# Patient Record
Sex: FEMALE | Race: OTHER | NOT HISPANIC OR LATINO | Employment: UNEMPLOYED | URBAN - METROPOLITAN AREA
[De-identification: names, ages, dates, MRNs, and addresses within clinical notes are randomized per-mention and may not be internally consistent; named-entity substitution may affect disease eponyms.]

---

## 2019-11-06 ENCOUNTER — INITIAL PRENATAL (OUTPATIENT)
Dept: OBGYN CLINIC | Facility: CLINIC | Age: 29
End: 2019-11-06

## 2019-11-06 DIAGNOSIS — Z34.01 ENCOUNTER FOR SUPERVISION OF NORMAL FIRST PREGNANCY IN FIRST TRIMESTER: Primary | ICD-10-CM

## 2019-11-06 PROCEDURE — NOBC: Performed by: NURSE PRACTITIONER

## 2019-11-06 NOTE — PROGRESS NOTES
Christi Odom is a 30year old  1 para 0 [place]  female who presents for her obstetrical intake  She is accompanied by her  and female family member Richard Adams to help with translation  Offered Deltagen interpretor due to medical terminology, however  declines  She is  8w 6d gestation, based upon exact LMP of 19  Periods are relatively regular  This pregnancy was unplanned but is desired  Obstetrical history:  None    Personal history:  Thyroid dysfunction-- was on synthroid in [facility], but PCP told her she didn't need it anymore    Surgical history:  None    Medications  None    Allergies:  NKDA    Family history:  Heart disease- mother  Diabetes- MGF    Substance use:  None    Genetic history:  Unremarkable    Infection history:  Unremarkable  + varicella      ASSESSMENT / PLAN:    1  Encounter for supervision of normal first pregnancy in first trimester  With assistance of patient's family member/  reviewed pregnancy packet, prenatal lab studies and  genetic screening  RV for dating/viability US  Undecided about genetic screening at this time  Questions answered  Encouraged flu vaccine-- she will consider  RV for ob exam with Dr Qiana Mabry  - Obstetric panel; Future  - HIV 1/2 AG-AB combo; Future  - Urine culture  - Ambulatory Referral to Maternal Fetal Medicine; Future  - Hemoglobin Electrophoresis; Future  - TSH, 3rd generation with Free T4 reflex;  Future

## 2019-11-08 LAB
EXTERNAL ABO GROUPING: NORMAL
EXTERNAL ANTIBODY SCREEN: NORMAL
EXTERNAL GENE TEST BETA-THALASSEMIA: NORMAL
EXTERNAL HEMATOCRIT: 38.2 %
EXTERNAL HEMOGLOBIN: 12.8 G/DL
EXTERNAL HEPATITIS B SURFACE ANTIGEN: NEGATIVE
EXTERNAL HIV-1 ANTIBODY: NEGATIVE
EXTERNAL HIV-1/2 AB-AG: NORMAL
EXTERNAL PLATELET COUNT: 214 K/ΜL
EXTERNAL RH FACTOR: POSITIVE
EXTERNAL RUBELLA IGG QUANTITATION: POSITIVE
EXTERNAL SYPHILIS RPR SCREEN: NORMAL

## 2019-11-11 ENCOUNTER — OB ABSTRACT (OUTPATIENT)
Dept: OBGYN CLINIC | Facility: CLINIC | Age: 29
End: 2019-11-11

## 2019-11-11 PROBLEM — E07.9 DISEASE OF THYROID GLAND: Status: ACTIVE | Noted: 2019-11-11

## 2019-11-22 ENCOUNTER — HOSPITAL ENCOUNTER (OUTPATIENT)
Dept: NON INVASIVE DIAGNOSTICS | Facility: HOSPITAL | Age: 29
Discharge: HOME | End: 2019-11-22

## 2019-11-22 ENCOUNTER — ULTRASOUND (OUTPATIENT)
Dept: OBGYN CLINIC | Facility: CLINIC | Age: 29
End: 2019-11-22

## 2019-11-22 ENCOUNTER — ROUTINE PRENATAL (OUTPATIENT)
Dept: OBGYN CLINIC | Facility: CLINIC | Age: 29
End: 2019-11-22

## 2019-11-22 ENCOUNTER — HOSPITAL ENCOUNTER (EMERGENCY)
Facility: HOSPITAL | Age: 29
Discharge: HOME | End: 2019-11-22
Attending: EMERGENCY MEDICINE | Admitting: EMERGENCY MEDICINE

## 2019-11-22 VITALS
SYSTOLIC BLOOD PRESSURE: 118 MMHG | BODY MASS INDEX: 24.51 KG/M2 | WEIGHT: 166 LBS | DIASTOLIC BLOOD PRESSURE: 76 MMHG | HEART RATE: 91 BPM | OXYGEN SATURATION: 98 %

## 2019-11-22 VITALS
BODY MASS INDEX: 23.86 KG/M2 | RESPIRATION RATE: 16 BRPM | DIASTOLIC BLOOD PRESSURE: 60 MMHG | WEIGHT: 161.6 LBS | SYSTOLIC BLOOD PRESSURE: 96 MMHG | TEMPERATURE: 98.3 F | OXYGEN SATURATION: 98 % | HEART RATE: 90 BPM

## 2019-11-22 DIAGNOSIS — O36.80X0 PREGNANCY WITH INCONCLUSIVE FETAL VIABILITY, SINGLE OR UNSPECIFIED FETUS: Primary | ICD-10-CM

## 2019-11-22 DIAGNOSIS — Z23 NEED FOR INFLUENZA VACCINATION: ICD-10-CM

## 2019-11-22 DIAGNOSIS — Z12.4 PAP SMEAR FOR CERVICAL CANCER SCREENING: ICD-10-CM

## 2019-11-22 DIAGNOSIS — M79.604 RIGHT LEG PAIN: ICD-10-CM

## 2019-11-22 DIAGNOSIS — I82.401 RIGHT LEG DVT (HCC): ICD-10-CM

## 2019-11-22 DIAGNOSIS — O22.30 DVT (DEEP VEIN THROMBOSIS) IN PREGNANCY: Primary | ICD-10-CM

## 2019-11-22 DIAGNOSIS — O22.31 DVT COMPLICATING PREGNANCY, FIRST TRIMESTER: Primary | ICD-10-CM

## 2019-11-22 DIAGNOSIS — Z34.01 ENCOUNTER FOR SUPERVISION OF NORMAL FIRST PREGNANCY IN FIRST TRIMESTER: ICD-10-CM

## 2019-11-22 DIAGNOSIS — R60.0 LEG EDEMA, RIGHT: ICD-10-CM

## 2019-11-22 PROBLEM — Z34.91 SUPERVISION OF NORMAL PREGNANCY IN FIRST TRIMESTER: Status: ACTIVE | Noted: 2019-11-22

## 2019-11-22 LAB
ANION GAP SERPL CALCULATED.3IONS-SCNC: 6 MMOL/L (ref 4–13)
APTT PPP: 27 SECONDS (ref 23–37)
BASOPHILS # BLD AUTO: 0.03 THOUSANDS/ΜL (ref 0–0.1)
BASOPHILS NFR BLD AUTO: 0 % (ref 0–1)
BUN SERPL-MCNC: 7 MG/DL (ref 5–25)
CALCIUM SERPL-MCNC: 9.5 MG/DL (ref 8.3–10.1)
CHLORIDE SERPL-SCNC: 100 MMOL/L (ref 100–108)
CO2 SERPL-SCNC: 26 MMOL/L (ref 21–32)
CREAT SERPL-MCNC: 0.45 MG/DL (ref 0.6–1.3)
EOSINOPHIL # BLD AUTO: 0.14 THOUSAND/ΜL (ref 0–0.61)
EOSINOPHIL NFR BLD AUTO: 2 % (ref 0–6)
ERYTHROCYTE [DISTWIDTH] IN BLOOD BY AUTOMATED COUNT: 12.4 % (ref 11.6–15.1)
EXTERNAL CHLAMYDIA RESULT: NOT DETECTED
GFR SERPL CREATININE-BSD FRML MDRD: 137 ML/MIN/1.73SQ M
GLUCOSE SERPL-MCNC: 87 MG/DL (ref 65–140)
HCT VFR BLD AUTO: 34.4 % (ref 34.8–46.1)
HGB BLD-MCNC: 11.1 G/DL (ref 11.5–15.4)
IMM GRANULOCYTES # BLD AUTO: 0.04 THOUSAND/UL (ref 0–0.2)
IMM GRANULOCYTES NFR BLD AUTO: 1 % (ref 0–2)
INR PPP: 1.04 (ref 0.84–1.19)
LYMPHOCYTES # BLD AUTO: 1.73 THOUSANDS/ΜL (ref 0.6–4.47)
LYMPHOCYTES NFR BLD AUTO: 20 % (ref 14–44)
MCH RBC QN AUTO: 28.7 PG (ref 26.8–34.3)
MCHC RBC AUTO-ENTMCNC: 32.3 G/DL (ref 31.4–37.4)
MCV RBC AUTO: 89 FL (ref 82–98)
MONOCYTES # BLD AUTO: 0.6 THOUSAND/ΜL (ref 0.17–1.22)
MONOCYTES NFR BLD AUTO: 7 % (ref 4–12)
N GONORRHOEA RRNA SPEC QL PROBE: NOT DETECTED
NEUTROPHILS # BLD AUTO: 6.06 THOUSANDS/ΜL (ref 1.85–7.62)
NEUTS SEG NFR BLD AUTO: 70 % (ref 43–75)
NRBC BLD AUTO-RTO: 0 /100 WBCS
PLATELET # BLD AUTO: 177 THOUSANDS/UL (ref 149–390)
PMV BLD AUTO: 9.7 FL (ref 8.9–12.7)
POTASSIUM SERPL-SCNC: 4 MMOL/L (ref 3.5–5.3)
PROTHROMBIN TIME: 13.7 SECONDS (ref 11.6–14.5)
RBC # BLD AUTO: 3.87 MILLION/UL (ref 3.81–5.12)
SODIUM SERPL-SCNC: 132 MMOL/L (ref 136–145)
WBC # BLD AUTO: 8.6 THOUSAND/UL (ref 4.31–10.16)

## 2019-11-22 PROCEDURE — 36415 COLL VENOUS BLD VENIPUNCTURE: CPT | Performed by: EMERGENCY MEDICINE

## 2019-11-22 PROCEDURE — 85610 PROTHROMBIN TIME: CPT | Performed by: EMERGENCY MEDICINE

## 2019-11-22 PROCEDURE — 99215 OFFICE O/P EST HI 40 MIN: CPT | Performed by: OBSTETRICS & GYNECOLOGY

## 2019-11-22 PROCEDURE — 90686 IIV4 VACC NO PRSV 0.5 ML IM: CPT | Performed by: OBSTETRICS & GYNECOLOGY

## 2019-11-22 PROCEDURE — 99283 EMERGENCY DEPT VISIT LOW MDM: CPT

## 2019-11-22 PROCEDURE — 85730 THROMBOPLASTIN TIME PARTIAL: CPT | Performed by: EMERGENCY MEDICINE

## 2019-11-22 PROCEDURE — 99285 EMERGENCY DEPT VISIT HI MDM: CPT | Performed by: EMERGENCY MEDICINE

## 2019-11-22 PROCEDURE — 93970 EXTREMITY STUDY: CPT

## 2019-11-22 PROCEDURE — 85025 COMPLETE CBC W/AUTO DIFF WBC: CPT | Performed by: EMERGENCY MEDICINE

## 2019-11-22 PROCEDURE — 90471 IMMUNIZATION ADMIN: CPT | Performed by: OBSTETRICS & GYNECOLOGY

## 2019-11-22 PROCEDURE — 80048 BASIC METABOLIC PNL TOTAL CA: CPT | Performed by: EMERGENCY MEDICINE

## 2019-11-22 PROCEDURE — 76801 OB US < 14 WKS SINGLE FETUS: CPT | Performed by: OBSTETRICS & GYNECOLOGY

## 2019-11-22 PROCEDURE — 96372 THER/PROPH/DIAG INJ SC/IM: CPT

## 2019-11-22 PROCEDURE — 93970 EXTREMITY STUDY: CPT | Performed by: SURGERY

## 2019-11-22 RX ORDER — FOLIC ACID 1 MG/1
TABLET ORAL DAILY
COMMUNITY
End: 2021-01-20

## 2019-11-22 RX ADMIN — ENOXAPARIN SODIUM 70 MG: 80 INJECTION SUBCUTANEOUS at 13:39

## 2019-11-22 NOTE — PROGRESS NOTES
SV BTZ=810 BPM  CRL=38 4mm=10w5d  RT CL Cyst=25 x 24 x 25 mm    EDC=6/14/2020    UT=87 x 70 x 76 mm  RT OV=34 x 34 x 29 mm, with CL Cyst=25 x 24 x 25 mm  LT OV=32 x 28 x 20 mm

## 2019-11-22 NOTE — ED PROVIDER NOTES
History  Chief Complaint   Patient presents with    Leg Pain     Pt arrives with   Reports that on Monday started with left leg pain  Pt went to her OB who ordered an u/s study of her legs, and found a DVT  Pt was then referred to ED  History provided by:  Patient   used: Yes    Medical Problem   Location:  Sent here after an outpatient duplex was positive for dvt of the right leg  She is 11 weeks pregnant  First baby  Severity:  Moderate  Onset quality:  Sudden  Duration:  1 week  Timing:  Constant  Progression:  Worsening  Chronicity:  New  Context:  Pain behind the right knee for a week  No swelling  No trauma  No cp or sob  No h/o DVT or PE  No risk factors for DVT or PE other than pregnancy  No FH of DVT/PE  Relieved by:  Nothing  Worsened by:  Palpation, ambulation  Ineffective treatments:  None tried  Associated symptoms: no abdominal pain, no chest pain and no shortness of breath    She travelled to this country from [facility] in June  That was the last significant traveling that she did  Prior to Admission Medications   Prescriptions Last Dose Informant Patient Reported? Taking?   folic acid (FOLVITE) 1 mg tablet  Self Yes No   Sig: Take by mouth daily      Facility-Administered Medications: None       Past Medical History:   Diagnosis Date    Disease of thyroid gland     Varicella        History reviewed  No pertinent surgical history  Family History   Problem Relation Age of Onset    Heart disease Mother     Diabetes Maternal Grandfather     Breast cancer Neg Hx     Colon cancer Neg Hx     Ovarian cancer Neg Hx     Uterine cancer Neg Hx      I have reviewed and agree with the history as documented      Social History     Tobacco Use    Smoking status: Never Smoker    Smokeless tobacco: Never Used   Substance Use Topics    Alcohol use: Never     Frequency: Never    Drug use: Never        Review of Systems   Respiratory: Negative for chest tightness and shortness of breath  Cardiovascular: Negative for chest pain and leg swelling  Gastrointestinal: Negative for abdominal pain  Genitourinary: Negative for hematuria, pelvic pain and vaginal bleeding  Musculoskeletal: Negative for gait problem and joint swelling  All other systems reviewed and are negative  Physical Exam  Physical Exam   Constitutional: She appears well-developed and well-nourished  She is cooperative  Non-toxic appearance  She does not have a sickly appearance  She does not appear ill  No distress  HENT:   Head: Normocephalic and atraumatic  Right Ear: Hearing normal  No drainage or swelling  Left Ear: Hearing normal  No drainage or swelling  Mouth/Throat: Mucous membranes are normal    Eyes: Conjunctivae and lids are normal  Right eye exhibits no discharge  Left eye exhibits no discharge  Neck: Trachea normal and normal range of motion  No JVD present  Cardiovascular: Normal rate, regular rhythm, normal heart sounds, intact distal pulses and normal pulses  Exam reveals no gallop and no friction rub  No murmur heard  Pulmonary/Chest: Effort normal and breath sounds normal  No stridor  No respiratory distress  She has no wheezes  She has no rales  Abdominal: Soft  Normal appearance  There is no tenderness  There is no rebound, no guarding and no CVA tenderness  Musculoskeletal: Normal range of motion  She exhibits tenderness  She exhibits no edema or deformity  Right upper leg: Normal         Right lower leg: She exhibits tenderness  She exhibits no bony tenderness, no swelling, no edema and no deformity  Neurological: She is alert  She has normal strength  No cranial nerve deficit or sensory deficit  She exhibits normal muscle tone  GCS eye subscore is 4  GCS verbal subscore is 5  GCS motor subscore is 6  Skin: Skin is warm, dry and intact  No rash noted  She is not diaphoretic  No pallor  Psychiatric: She has a normal mood and affect   Her speech is normal  Cognition and memory are normal    Nursing note and vitals reviewed        Vital Signs  ED Triage Vitals [11/22/19 1150]   Temperature Pulse Respirations Blood Pressure SpO2   98 3 °F (36 8 °C) 86 16 101/59 98 %      Temp Source Heart Rate Source Patient Position - Orthostatic VS BP Location FiO2 (%)   Oral Monitor Sitting Right arm --      Pain Score       Worst Possible Pain           Vitals:    11/22/19 1150 11/22/19 1259 11/22/19 1443   BP: 101/59 100/64 96/60   Pulse: 86 85 90   Patient Position - Orthostatic VS: Sitting Sitting Sitting         Visual Acuity      ED Medications  Medications   enoxaparin (LOVENOX) subcutaneous injection 70 mg (70 mg Subcutaneous Given 11/22/19 1339)       Diagnostic Studies  Results Reviewed     Procedure Component Value Units Date/Time    Protime-INR [[id number]]  (Normal) Collected:  11/22/19 1317    Lab Status:  Final result Specimen:  Blood from Arm, Right Updated:  11/22/19 1337     Protime 13 7 seconds      INR 1 04    APTT [[id number]]  (Normal) Collected:  11/22/19 1317    Lab Status:  Final result Specimen:  Blood from Arm, Right Updated:  11/22/19 1337     PTT 27 seconds     Basic metabolic panel [[id number]]  (Abnormal) Collected:  11/22/19 1317    Lab Status:  Final result Specimen:  Blood from Arm, Right Updated:  11/22/19 1334     Sodium 132 mmol/L      Potassium 4 0 mmol/L      Chloride 100 mmol/L      CO2 26 mmol/L      ANION GAP 6 mmol/L      BUN 7 mg/dL      Creatinine 0 45 mg/dL      Glucose 87 mg/dL      Calcium 9 5 mg/dL      eGFR 137 ml/min/1 73sq m     Narrative:       Fern guidelines for Chronic Kidney Disease (CKD):     Stage 1 with normal or high GFR (GFR > 90 mL/min/1 73 square meters)    Stage 2 Mild CKD (GFR = 60-89 mL/min/1 73 square meters)    Stage 3A Moderate CKD (GFR = 45-59 mL/min/1 73 square meters)    Stage 3B Moderate CKD (GFR = 30-44 mL/min/1 73 square meters)    Stage 4 Severe CKD (GFR = 15-29 mL/min/1 73 square meters)    Stage 5 End Stage CKD (GFR <15 mL/min/1 73 square meters)  Note: GFR calculation is accurate only with a steady state creatinine    CBC and differential [[id number]]  (Abnormal) Collected:  11/22/19 1317    Lab Status:  Final result Specimen:  Blood from Arm, Right Updated:  11/22/19 1324     WBC 8 60 Thousand/uL      RBC 3 87 Million/uL      Hemoglobin 11 1 g/dL      Hematocrit 34 4 %      MCV 89 fL      MCH 28 7 pg      MCHC 32 3 g/dL      RDW 12 4 %      MPV 9 7 fL      Platelets 208 Thousands/uL      nRBC 0 /100 WBCs      Neutrophils Relative 70 %      Immat GRANS % 1 %      Lymphocytes Relative 20 %      Monocytes Relative 7 %      Eosinophils Relative 2 %      Basophils Relative 0 %      Neutrophils Absolute 6 06 Thousands/µL      Immature Grans Absolute 0 04 Thousand/uL      Lymphocytes Absolute 1 73 Thousands/µL      Monocytes Absolute 0 60 Thousand/µL      Eosinophils Absolute 0 14 Thousand/µL      Basophils Absolute 0 03 Thousands/µL                  No orders to display              Procedures  Procedures       ED Course  ED Course as of Nov 22 1516   Fri Nov 22, 2019   1254 Per the vascular ultrasound report patient has a acute DVT in the distal femoral vein, popliteal vein and posterior tibial veins and peroneal veins throughout the calf  The left leg was within normal limits  MDM  Number of Diagnoses or Management Options  DVT (deep vein thrombosis) in pregnancy:   Right leg DVT [facility]):   Diagnosis management comments: I did discuss the case with the patient's OBGYN doctor will place her on Lovenox  I did involve case management in helping with the medication  She currently does not have insurance but apparently her insurance is pending and should get in the next couple of months  A coupon was given so that they can obtain the medication at [facility] and the patient and the  were agreeable to this plan of action    She does not have any bleeding issues  Her renal function is normal   Her platelets are normal   She was given the medication with teaching by the nurse  She has no signs or symptoms of PE  I did go over the importance of continuing this medication through her pregnancy  Amount and/or Complexity of Data Reviewed  Clinical lab tests: reviewed and ordered  Tests in the radiology section of CPT®: reviewed  Discuss the patient with other providers: yes    Patient Progress  Patient progress: stable      Disposition  Final diagnoses:   DVT (deep vein thrombosis) in pregnancy   Right leg DVT ([address] )     Time reflects when diagnosis was documented in both MDM as applicable and the Disposition within this note     Time User Action Codes Description Comment    11/22/2019  1:47 PM Tia Griselda Add [O22 30] DVT (deep vein thrombosis) in pregnancy     11/22/2019  1:47 PM Tia Griselda Modify [O22 30] DVT (deep vein thrombosis) in pregnancy     11/22/2019  2:50 PM Tia Griselda Add [I82 401] Right leg DVT [facility])       ED Disposition     ED Disposition Condition Date/Time Comment    Discharge Stable Fri Nov 22, 2019  2:49 PM Janeth Franco discharge to home/self care  Follow-up Information     Follow up With Specialties Details Why Contact Info    Betina Weiss MD Emergency Medicine, Internal Medicine Schedule an appointment as soon as possible for a visit in 1 week  [address] [id number]  [phone]            Patient's Medications   Discharge Prescriptions    ENOXAPARIN (LOVENOX) 80 MG/0 8 ML    Inject 0 7 mL (70 mg total) under the skin 2 (two) times a day       Start Date: 11/22/2019End Date: 12/22/2019       Order Dose: 70 mg       Quantity: 42 mL    Refills: 0     No discharge procedures on file      ED Provider  Electronically Signed by           Murphy Kehr, MD  11/22/19 3282

## 2019-11-22 NOTE — PROGRESS NOTES
Late Entry:  Pt is a 30 y o   11w1d  Pregnancy is uncomplicated thus far  Pt denies quickening  Denies vb, lof, ctx  Pt reports that on Monday she developed a sharp pain in the back of her right leg and it has been persistent since that time, making it difficult to walk  Pt's  reports he went to [facility] and asked the pharmacist for support stockings and bought her thigh high support stockings at that time, of which she only wears the right  This only helped mildly and the patient continues to have pain  On examination the right thigh, knee and calf were noted to appear larger than the left and upon  Measuring them, this finding was consistent  Pt had significant tenderness in the popliteal region of the right leg  Pt advised she likely has a DVT and advised to have duplex scan which we were able to schedule for 10:30 am today  Pap smear and cultures obtained  Office u/s reveals IUP cwd with +FHT  Influenza vaccination administered  PNL labs wnl and reviewed with patient  PTL precautions reviewed  Received phone call regarding duplex:  RIGHT LOWER LIMB:  There is acute deep vein thrombosis in the distal femoral vein, popliteal vein,  and the posterior tibial veins and peroneal veins throughout the calf  No evidence of superficial thrombophlebitis noted  Doppler evaluation shows a normal response to augmentation maneuvers  Popliteal, posterior tibial and anterior tibial arterial Doppler waveforms are  Triphasic  Reviewed with patient the findings on the phone while she was in radiology  Advised pt to go to ED for further evaluation and treatment  Pt agreeable    Received phone call from ED physician after his evaluation--per protocol no CT chest performed as pt with normal vital signs and no c/o SOB   helping pt to obtain Lovenox as she does not have insurance yet (MA is pending) and pt given therapeutic dose of Lovenox in ED      Spoke with Dr Phyllis Walton from [facility] for added input--recommends Beta 2 glycoprotein, Anticardiolipin Ab, Prothrombin Gene mutation and Factor V leiden testing as these are not affected by Lovenox and follow up with [facility]  Referral placed  Pt called and notified [facility] would contact her to schedule appointment and that I faxed her lab slips to the [facility] that she reports she will be able to have her labs drawn at on Monday  Pt advised to go to ED if she develops SOB or worsening symptoms   Pt agreeable

## 2019-11-22 NOTE — ED NOTES
Reviewed with pt and  about the following  · The concentration Lovenox syringes will come in will be the same as here: 80 mg/0 8 mL  · Discarding the excess medication for the appropriate dose of 70 mg  · Cleaning the skin  · Safety mechanism of needle  · Proper disposal of needles      Freya Bergeron RN  11/22/19 3242

## 2019-11-27 ENCOUNTER — OB ABSTRACT (OUTPATIENT)
Dept: OBGYN CLINIC | Facility: CLINIC | Age: 29
End: 2019-11-27

## 2019-12-03 ENCOUNTER — ULTRASOUND (OUTPATIENT)
Dept: OBGYN CLINIC | Facility: CLINIC | Age: 29
End: 2019-12-03

## 2019-12-03 ENCOUNTER — TELEPHONE (OUTPATIENT)
Dept: OBGYN CLINIC | Facility: CLINIC | Age: 29
End: 2019-12-03

## 2019-12-03 DIAGNOSIS — Z03.74 FETAL GROWTH PROBLEM SUSPECTED BUT NOT FOUND: Primary | ICD-10-CM

## 2019-12-03 DIAGNOSIS — O22.31 DVT COMPLICATING PREGNANCY, FIRST TRIMESTER: ICD-10-CM

## 2019-12-03 DIAGNOSIS — O22.30 DVT (DEEP VEIN THROMBOSIS) IN PREGNANCY: ICD-10-CM

## 2019-12-03 PROCEDURE — 76801 OB US < 14 WKS SINGLE FETUS: CPT | Performed by: OBSTETRICS & GYNECOLOGY

## 2019-12-03 NOTE — TELEPHONE ENCOUNTER
Patient's  is asking that a new script for Lovenox be sent to [address] [place] [address] and is requesting a phone call from Dr Momin Seek

## 2019-12-04 NOTE — TELEPHONE ENCOUNTER
I called her  back  He reports that he only has 8 doses of her lovenox remaining and refills are need  I have sent them    He was questioning if his wife would be on injections for life  I reviewed that she will require anticoagulation throughout the pregnancy and postpartum for 6-12 weeks depending on MFM recommendation and then she will stop assuming she does not develop another clot, but will require lovenox in each subsequent pregnancy as well  We also reviewed that closer to term, she will switch to heparin for management of her delivery

## 2019-12-20 ENCOUNTER — ROUTINE PRENATAL (OUTPATIENT)
Dept: OBGYN CLINIC | Facility: CLINIC | Age: 29
End: 2019-12-20

## 2019-12-20 VITALS
SYSTOLIC BLOOD PRESSURE: 100 MMHG | BODY MASS INDEX: 25.18 KG/M2 | HEIGHT: 69 IN | DIASTOLIC BLOOD PRESSURE: 64 MMHG | WEIGHT: 170 LBS

## 2019-12-20 DIAGNOSIS — O22.32 DVT COMPLICATING PREGNANCY, SECOND TRIMESTER: Primary | ICD-10-CM

## 2019-12-20 PROCEDURE — 99213 OFFICE O/P EST LOW 20 MIN: CPT | Performed by: OBSTETRICS & GYNECOLOGY

## 2019-12-20 NOTE — PROGRESS NOTES
Pt is a 30 y o   15w1d  Pregnancy is complicated by DVT in first trimester  Pt reports denies quickening  Denies vb, lof, ctx  PTL precautions  Reviewed  PNL labs reviewed and wnl  Pt reports she had her factor V leiden, Prothrombin gene, b2 glycoprotein labs were marilyn, results are still pending  MFM consult placed for anatomy scan  Pt taking lovenox twice daily  F/U 4 weeks

## 2019-12-28 DIAGNOSIS — O22.30 DVT (DEEP VEIN THROMBOSIS) IN PREGNANCY: ICD-10-CM

## 2020-01-02 ENCOUNTER — TELEPHONE (OUTPATIENT)
Dept: GYNECOLOGY | Facility: CLINIC | Age: 30
End: 2020-01-02

## 2020-01-02 NOTE — TELEPHONE ENCOUNTER
Patient's  called stating that they are having issues getting refill at [facility] for Lovenox  Asked if new script could be sent to [facility] in [place]  [facility] [id number]

## 2020-01-08 ENCOUNTER — TELEPHONE (OUTPATIENT)
Dept: OBGYN CLINIC | Facility: CLINIC | Age: 30
End: 2020-01-08

## 2020-01-08 NOTE — TELEPHONE ENCOUNTER
I called the patient back    She reports that her right foot is swollen but there is no pain  She denies any swelling in her calf or thigh  Pt advised she may go to ED for further evaluation secondary to h o DVT even though she is on blood thinners  Pt reports that since it is only in her foot she will observe and rest and see if it improves  Advised to go to ED with increasing swelling, development of pain, SOB or swelling in calf or thigh   Pt agreeaboe

## 2020-01-08 NOTE — TELEPHONE ENCOUNTER
Patient's  called asking to speak to Dr Crystal Coombs in regards to changes to his wife's leg  He did state that she is not in pain, but had a few questions for Dr Crystal Coombs that he would like to speak to her directly

## 2020-01-17 ENCOUNTER — ROUTINE PRENATAL (OUTPATIENT)
Dept: OBGYN CLINIC | Facility: CLINIC | Age: 30
End: 2020-01-17
Payer: COMMERCIAL

## 2020-01-17 VITALS
BODY MASS INDEX: 25.52 KG/M2 | HEART RATE: 81 BPM | WEIGHT: 172.8 LBS | SYSTOLIC BLOOD PRESSURE: 100 MMHG | DIASTOLIC BLOOD PRESSURE: 60 MMHG | OXYGEN SATURATION: 98 %

## 2020-01-17 DIAGNOSIS — O99.891 BACK PAIN AFFECTING PREGNANCY IN SECOND TRIMESTER: ICD-10-CM

## 2020-01-17 DIAGNOSIS — Z86.39 HISTORY OF THYROID DISEASE: ICD-10-CM

## 2020-01-17 DIAGNOSIS — R30.0 DYSURIA DURING PREGNANCY IN SECOND TRIMESTER: ICD-10-CM

## 2020-01-17 DIAGNOSIS — M54.9 BACK PAIN AFFECTING PREGNANCY IN SECOND TRIMESTER: ICD-10-CM

## 2020-01-17 DIAGNOSIS — O22.32 DVT COMPLICATING PREGNANCY, SECOND TRIMESTER: Primary | ICD-10-CM

## 2020-01-17 DIAGNOSIS — O26.892 DYSURIA DURING PREGNANCY IN SECOND TRIMESTER: ICD-10-CM

## 2020-01-17 LAB
SL AMB  POCT GLUCOSE, UA: NEGATIVE
SL AMB LEUKOCYTE ESTERASE,UA: NEGATIVE
SL AMB POCT BILIRUBIN,UA: NORMAL
SL AMB POCT BLOOD,UA: NEGATIVE
SL AMB POCT CLARITY,UA: NORMAL
SL AMB POCT COLOR,UA: NORMAL
SL AMB POCT KETONES,UA: NEGATIVE
SL AMB POCT NITRITE,UA: NEGATIVE
SL AMB POCT PH,UA: 6
SL AMB POCT SPECIFIC GRAVITY,UA: 1.03
SL AMB POCT URINE PROTEIN: NEGATIVE
SL AMB POCT UROBILINOGEN: 0.2

## 2020-01-17 PROCEDURE — 81002 URINALYSIS NONAUTO W/O SCOPE: CPT | Performed by: OBSTETRICS & GYNECOLOGY

## 2020-01-17 PROCEDURE — PNV: Performed by: OBSTETRICS & GYNECOLOGY

## 2020-01-17 NOTE — PROGRESS NOTES
Pt is a 30 y o   19w1d  Pregnancy is complicated by DVT in the first trimester  Pt reports +FM  Denies vb, lof, ctx  PTL precautions reviewed  Pt c/o dyuria, UDip negative, will send for culture  Pt c o back pain, advised support belt  Pt continues to use her Lovenox without complication  Pt with minimal right foot swelling  Pt reports a h o being treated for thyroid issues in [facility] and is concerned that since her physician in [facility] told her to stop her Thyroid studies are not normal--TSH ordered  Has anatomy scan scheduled for   F/u 4 weeks

## 2020-01-19 PROBLEM — D68.51 FACTOR V LEIDEN, PROTHROMBIN GENE MUTATION (HCC): Status: ACTIVE | Noted: 2020-01-19

## 2020-01-19 PROBLEM — D68.51 FACTOR V LEIDEN MUTATION AFFECTING PREGNANCY (HCC): Status: ACTIVE | Noted: 2020-01-19

## 2020-01-19 PROBLEM — O99.119 FACTOR V LEIDEN MUTATION AFFECTING PREGNANCY (HCC): Status: ACTIVE | Noted: 2020-01-19

## 2020-01-20 PROBLEM — D68.51 FACTOR V LEIDEN, PROTHROMBIN GENE MUTATION (HCC): Status: RESOLVED | Noted: 2020-01-19 | Resolved: 2020-01-20

## 2020-01-20 NOTE — PATIENT INSTRUCTIONS
Thank you for choosing us for your  care today  If you have any questions about your ultrasound or care, please do not hesitate to contact us or your primary obstetrician  Some general instructions for your pregnancy are:     Exercise: we encourage most pregnant women to get regular physical activity in pregnancy  Exercise has been shown to reduce the risk of several pregnancy-related complications  Unless instructed otherwise, you can aim for 22 minutes per day (150 minutes per week! )   Nutrition: aim for calcium-rich and iron-rich foods as well as healthy sources of protein   Weight: ask your doctor what is the appropriate amount of weight for you to gain in pregnancy  We have nutritionists here if you would like to meet with them   Protection from influenza: get yourself and your entire household vaccinated against influenza  Tell your partner to get vaccinated as well  Good hand hygiene can reduce the spread of this potentially deadly virus  Insist that everyone who is going to hold or be around your baby get vaccinated   Educate yourself about preeclampsia: preeclampsia is a common, serious complication in pregnancy  A blood pressure of 140mmHg (top number or systolic) OR 67FFPL (bottom number or diastolic) is elevated and needs evaluation by your doctor  Ask your doctor early in pregnancy if you should take aspirin (not motrin or tylenol) to prevent preeclampsia  If you were advised to take aspirin to prevent preeclampsia, a daily dose of 162mg or 81mg is advised  One resource to learn more is www  preeclampsia org    If you smoke, try to reduce how many cigarettes you smoke or quit completely  Do not vape       Other warning signs to watch out for in pregnancy or postpartum: chest pain, obstructed breathing or shortness of breath, seizures, thoughts of hurting yourself or your baby, bleeding, a painful or swollen leg, fever, or headache (AWHONN POST-BIRTH Warning Signs campaign)  If these happen call 911  Itching is also not normal in pregnancy and if you experience this, especially over your hands and feet, potentially worse at night, notify your doctors  Placenta Previa   WHAT YOU NEED TO KNOW:   What is placenta previa? Placenta previa is a condition in which your placenta grows near or over your cervix (opening of your uterus)  The placenta forms during pregnancy and provides oxygen and nutrition to your unborn baby  The placenta also removes waste products from the fetus  Normally, your placenta grows in the upper part of your uterus  When your placenta grows near your cervix, it may block the opening to your vagina  You may have vaginal bleeding that could harm you and your unborn baby  What increases my risk for placenta previa? The exact cause of placenta previa is not clear  Your risk increases as you get older  Your risk also increases if you are of  descent  The following may also increase your risk:  · Previous placenta previa:  Your risk for placenta previa increases if you have had it during previous pregnancies  · Infertility treatments:  Treatments used to increase your ability to get pregnant may make you more likely to have placenta previa  · Pregnancy: The more times you have been pregnant, the higher your risk for this condition  Your risk increases if your pregnancies are less than 1 year apart  Being pregnant with more than 1 baby, such as twins, also increases your risk  Past pregnancies that resulted in an  or miscarriage may also increase your risk for placenta previa  · Past :  A  can cause changes in your uterine tissue that increase your risk for placenta previa  · Smoking and street drug use:  Smoking before and during pregnancy increases your risk for placenta previa  Use of street drugs such as cocaine also may lead to placenta previa      · Uterine scarring:  Placenta previa is more likely to occur if you have scarring in your uterus  Scarring may occur from a past surgery or pregnancy  Scarring may increase the risk that your placenta will grow in the lower part of your uterus  What are the signs and symptoms of placenta previa? · Vaginal bleeding:  Bleeding usually occurs in the late second or early third trimester of pregnancy, but can occur at any time  You may have small or large amounts of bleeding that normally does not cause pain  You may have bleeding after sex  Bleeding may also occur suddenly  · Contractions:  Contractions may cause abdominal pain or cramping  How is placenta previa diagnosed? Placenta previa is often found during a normal pregnancy visit with your healthcare provider  The later in your pregnancy it is found, the higher the risk that it will not go away  Your healthcare provider will ask about your health and any previous pregnancies  Tell him if you have had past uterine surgeries or procedures  Your healthcare provider may check your cervix by gently putting a speculum into your vagina  A speculum is a tool that opens your vagina to help your healthcare provider see your cervix  You may also need any of the following:  · Ultrasound:      ¨ Transvaginal ultrasound:  A small tube is placed inside your vagina so your healthcare provider can see your uterus  It may show if your placenta lies in the lower part of your uterus  It may also show how close your placenta is to the edge or top of your cervix  ¨ Abdominal ultrasound:  A small device will be moved around your abdomen to show pictures of your uterus  Your healthcare provider may want you to have a full bladder for this test  It may show if your placenta is blocking the opening of your uterus  It may also show problems with your baby, such as slow growth  ¨ Doppler ultrasound:  A Doppler ultrasound may be done to check if your placenta has grown into the wall of your uterus      · MRI:  This scan uses powerful magnets and a computer to take pictures of your pelvis  MRI pictures may show where your placenta is in your uterus  An MRI may also show if and how deep your placenta has grown into your uterine wall  You may be given dye to help the placenta show up better in the pictures  Tell the healthcare provider if you have ever had an allergic reaction to contrast dye  Do not enter the MRI room with anything made of metal  Metal can cause serious injury  Tell the healthcare provider if you have any metal in or on your body  How is placenta previa treated? Placenta previa may go away later in your pregnancy, and you may not need treatment  Your placenta may move when your uterus changes shape as you get closer to delivery  If you need treatment, it may depend on how far along you are in your pregnancy  Your treatment also depends on how much of your cervix is covered by your placenta  You may need any of the following:  · Bed rest:  You may need to be on bedrest until your baby is ready to be born  You may be able to rest at home, or you may need to stay in the hospital  If you are in the hospital, your healthcare provider may keep you on a monitor  A monitor is used to watch your vital signs (heart rate, breathing, and blood pressure)  You may also need to wear a fetal monitor  Ask your healthcare provider which activities you may do while you are on bedrest     · Blood transfusion:  You may need a blood transfusion if you lose a large amount of blood  During a blood transfusion, you will get donated blood through an IV  You may need a transfusion while you are still pregnant or after your baby is born  · Medicines:      ¨ Steroids:  Steroids may be given if you need to deliver your baby earlier than expected  These medicines help your baby's lungs to mature and prevent breathing problems after he is born      ¨ Rh immune globulin shot:  You may be given an Rh immune globulin injection (RhIG) before your baby is born  RhIG is needed if you and your baby have different Rh blood types (incompatibility)  Rh incompatibility means that your baby has a protein in his blood that you do not  Your body may make antibodies against your baby's blood and destroy his blood cells  RhIG injections prevent this from happening  ¨ Tocolytics: Tocolytics are given to stop contractions if your baby is not ready to be born  Contractions are when the muscles of your uterus tighten and loosen  · Amniocentesis: An amniocentesis test may be done between weeks 36 and 37 of your pregnancy if you are not bleeding  This test is done to check your unborn baby's lungs if you have a  date planned  Ask your healthcare provider for more information about amniocentesis  · Delivery of your baby:  Early delivery of your baby may be needed  If your due date is close and your placenta does not cover your cervix, you may be able to give birth vaginally  If your placenta covers most or all of your cervix, a  will be done  A  may also be done if you have heavy bleeding or you or your baby is in danger  What are the risks of placenta previa? · Medicines used to treat placenta previa may cause you to have a fast heartbeat  You may be at risk for blood clots  Your placenta may pull away from your uterus when you have contractions  You may have sudden, large amounts of bleeding  You may need a blood transfusion to replace the blood you have lost  Large amounts of blood loss may be life-threatening  Placenta previa increases your risk of placenta accreta  Placenta accreta is when your placenta attaches deep into the wall of your uterus  The deep attachment makes it hard for your placenta to come out after your baby is born  Placenta accreta increases your risk for bleeding even further  · Your baby may not grow as he should inside your uterus  He may be forced into an abnormal birthing position   Your unborn baby may also have problems with his heartbeat  Your baby may be born too early and his lungs may not be fully developed  He may also be very small  This could be life-threatening for your baby  If you need surgery to deliver your baby, you may get an infection or bleed more than expected  After you give birth, you may need surgery to remove your uterus if you have bleeding that cannot be stopped  You may get a blood clot in your leg or arm  The clot may travel to your heart or brain and cause life-threatening problems, such as a heart attack or stroke  How can I manage placenta previa? · Have a safety plan and someone to take you to the hospital if needed  · Stay within a short distance to the hospital so you can get there quickly  · Do not douche or have sex  These may cause bleeding  When should I contact my healthcare provider? · You feel abdominal cramps, pressure, or tightening  · Your heart is beating faster than normal for you  · You have questions or concerns about your condition or care  When should I seek immediate care or call 911? · You have any amount of bleeding from your vagina  · You are having severe abdominal pain or contractions  · You feel faint or too weak to stand up  · You suddenly feel lightheaded and short of breath  · You have chest pain when you take a deep breath or cough  · You cough up blood  CARE AGREEMENT:   You have the right to help plan your care  Learn about your health condition and how it may be treated  Discuss treatment options with your caregivers to decide what care you want to receive  You always have the right to refuse treatment  The above information is an  only  It is not intended as medical advice for individual conditions or treatments  Talk to your doctor, nurse or pharmacist before following any medical regimen to see if it is safe and effective for you    © 2017 [employer] Information is for End User's use only and may not be sold, redistributed or otherwise used for commercial purposes  All illustrations and images included in CareNotes® are the copyrighted property of A D A M [employer] Inc  or Donavon Reyes

## 2020-01-22 ENCOUNTER — ROUTINE PRENATAL (OUTPATIENT)
Dept: PERINATAL CARE | Facility: CLINIC | Age: 30
End: 2020-01-22
Payer: COMMERCIAL

## 2020-01-22 VITALS
HEIGHT: 69 IN | SYSTOLIC BLOOD PRESSURE: 114 MMHG | DIASTOLIC BLOOD PRESSURE: 58 MMHG | HEART RATE: 90 BPM | BODY MASS INDEX: 25.62 KG/M2 | WEIGHT: 173 LBS

## 2020-01-22 DIAGNOSIS — Z36.86 ENCOUNTER FOR ANTENATAL SCREENING FOR CERVICAL LENGTH: ICD-10-CM

## 2020-01-22 DIAGNOSIS — O99.119 FACTOR V LEIDEN MUTATION AFFECTING PREGNANCY (HCC): ICD-10-CM

## 2020-01-22 DIAGNOSIS — D68.51 FACTOR V LEIDEN MUTATION AFFECTING PREGNANCY (HCC): ICD-10-CM

## 2020-01-22 DIAGNOSIS — O22.32 DVT COMPLICATING PREGNANCY, SECOND TRIMESTER: Primary | ICD-10-CM

## 2020-01-22 DIAGNOSIS — O44.40 LOW-LYING PLACENTA: ICD-10-CM

## 2020-01-22 DIAGNOSIS — Z36.3 ENCOUNTER FOR ANTENATAL SCREENING FOR MALFORMATIONS: ICD-10-CM

## 2020-01-22 PROBLEM — O44.02 PLACENTA PREVIA, SECOND TRIMESTER: Status: ACTIVE | Noted: 2020-01-22

## 2020-01-22 PROCEDURE — 76805 OB US >/= 14 WKS SNGL FETUS: CPT | Performed by: OBSTETRICS & GYNECOLOGY

## 2020-01-22 PROCEDURE — 76817 TRANSVAGINAL US OBSTETRIC: CPT | Performed by: OBSTETRICS & GYNECOLOGY

## 2020-01-22 PROCEDURE — 99242 OFF/OP CONSLTJ NEW/EST SF 20: CPT | Performed by: OBSTETRICS & GYNECOLOGY

## 2020-01-22 NOTE — PROGRESS NOTES
A transvaginal ultrasound was performed  Sonographer note on use of High Level Disinfection Process (Trophon) for transvaginal probe# 1 used, serial # [id number]     Isha Brito RDMS

## 2020-01-22 NOTE — LETTER
January 24, 2020     Daniel Fischer MD  [address]    Patient: Devora Marlow   YOB: 1990   Date of Visit: 1/22/2020       Dear Dr Hilario Martinez: Thank you for referring Jaenth Franco to me for evaluation  Below are my notes for this consultation  If you have questions, please do not hesitate to call me  I look forward to following your patient along with you  Sincerely,        Niels Velarde MD        CC: No Recipients  Niels Velarde MD  1/22/2020  3:14 PM  Sign at close encounter  [facility]: Ms Binta Gutierrez was seen today at 19w6d for anatomic survey and cervical length screening ultrasound  See ultrasound report under "OB Procedures" tab  Please don't hesitate to contact our office with any concerns or questions    Niels Velarde MD

## 2020-01-22 NOTE — PROGRESS NOTES
[facility]: Ms Barbara Vail was seen today at 19w6d for anatomic survey and cervical length screening ultrasound  See ultrasound report under "OB Procedures" tab  Please don't hesitate to contact our office with any concerns or questions    Waldron Dubin, MD

## 2020-01-23 ENCOUNTER — TELEPHONE (OUTPATIENT)
Dept: OBGYN CLINIC | Facility: CLINIC | Age: 30
End: 2020-01-23

## 2020-01-23 PROBLEM — D68.51 HETEROZYGOUS FACTOR V LEIDEN MUTATION (HCC): Status: ACTIVE | Noted: 2020-01-23

## 2020-02-10 ENCOUNTER — TELEPHONE (OUTPATIENT)
Dept: OBGYN CLINIC | Facility: CLINIC | Age: 30
End: 2020-02-10

## 2020-02-10 ENCOUNTER — ROUTINE PRENATAL (OUTPATIENT)
Dept: OBGYN CLINIC | Facility: CLINIC | Age: 30
End: 2020-02-10

## 2020-02-10 VITALS
OXYGEN SATURATION: 98 % | BODY MASS INDEX: 25.98 KG/M2 | DIASTOLIC BLOOD PRESSURE: 68 MMHG | WEIGHT: 175.4 LBS | HEIGHT: 69 IN | HEART RATE: 83 BPM | SYSTOLIC BLOOD PRESSURE: 100 MMHG

## 2020-02-10 DIAGNOSIS — O44.40 LOW-LYING PLACENTA: ICD-10-CM

## 2020-02-10 DIAGNOSIS — O22.32 DVT COMPLICATING PREGNANCY, SECOND TRIMESTER: Primary | ICD-10-CM

## 2020-02-10 PROCEDURE — PNV: Performed by: OBSTETRICS & GYNECOLOGY

## 2020-02-10 NOTE — TELEPHONE ENCOUNTER
Pt's  called requesting Dr Leticia Boothe to please call him he has a few questions for provider about his wife

## 2020-02-10 NOTE — PROGRESS NOTES
Pt is a 30 y o   22w4d  Pregnancy is complicated by DVT, Factor V Leiden Heterozygote, low lying placenta  Pt reports +FM  Denies vb, lof, ctx  PTL precautions reviewed  Normal anatomy scan, low lying placenta, repeat at 32 weeks  F/U 4 weeks

## 2020-02-13 NOTE — TELEPHONE ENCOUNTER
I called the patient's  back  He reports his wife is upset because she understood her placenta is weak  I reviewed with her  that placenta is close to the cervix (low lying)  We reviewed that she will have a repeat u/s at 32 weeks to ensure that it has "moved"  We reviewed that as the uterus grows, the likelihood that the placental edge will be far enough away to deliver vaginally is high  He reports a good understanding and will reassure her

## 2020-03-16 ENCOUNTER — ULTRASOUND (OUTPATIENT)
Dept: PERINATAL CARE | Facility: CLINIC | Age: 30
End: 2020-03-16
Payer: COMMERCIAL

## 2020-03-16 ENCOUNTER — ROUTINE PRENATAL (OUTPATIENT)
Dept: OBGYN CLINIC | Facility: CLINIC | Age: 30
End: 2020-03-16

## 2020-03-16 VITALS
BODY MASS INDEX: 27.47 KG/M2 | DIASTOLIC BLOOD PRESSURE: 62 MMHG | WEIGHT: 186 LBS | OXYGEN SATURATION: 98 % | HEART RATE: 101 BPM | SYSTOLIC BLOOD PRESSURE: 114 MMHG

## 2020-03-16 VITALS
SYSTOLIC BLOOD PRESSURE: 96 MMHG | HEART RATE: 86 BPM | WEIGHT: 185 LBS | HEIGHT: 69 IN | DIASTOLIC BLOOD PRESSURE: 54 MMHG | BODY MASS INDEX: 27.4 KG/M2

## 2020-03-16 DIAGNOSIS — O44.40 LOW-LYING PLACENTA: ICD-10-CM

## 2020-03-16 DIAGNOSIS — O22.32 DVT COMPLICATING PREGNANCY, SECOND TRIMESTER: Primary | ICD-10-CM

## 2020-03-16 DIAGNOSIS — O44.40 LOW-LYING PLACENTA: Primary | ICD-10-CM

## 2020-03-16 DIAGNOSIS — Z36.89 ENCOUNTER FOR ULTRASOUND TO CHECK FETAL GROWTH: ICD-10-CM

## 2020-03-16 PROCEDURE — 76817 TRANSVAGINAL US OBSTETRIC: CPT | Performed by: OBSTETRICS & GYNECOLOGY

## 2020-03-16 PROCEDURE — 76816 OB US FOLLOW-UP PER FETUS: CPT | Performed by: OBSTETRICS & GYNECOLOGY

## 2020-03-16 PROCEDURE — PNV: Performed by: OBSTETRICS & GYNECOLOGY

## 2020-03-16 NOTE — PROGRESS NOTES
A transvaginal ultrasound was performed  Sonographer note on use of High Level Disinfection Process (Trophon) for transvaginal probe# 1  used, serial # [id number]  UB [id number]    Chloé Farias RDMS

## 2020-03-16 NOTE — PROGRESS NOTES
[facility]: Ms Adrian Luna was seen today at 27w4d for followup placental location ultrasound with fetal growth measurement  See ultrasound report under "OB Procedures" tab  Please don't hesitate to contact our office with any concerns or questions    Natacha Gonsales MD

## 2020-03-16 NOTE — PATIENT INSTRUCTIONS
Thank you for choosing us for your  care today  If you have any questions about your ultrasound or care, please do not hesitate to contact us or your primary obstetrician  Some general instructions for your pregnancy are:     Exercise: we encourage most pregnant women to get regular physical activity in pregnancy  Exercise has been shown to reduce the risk of several pregnancy-related complications  Unless instructed otherwise, you can aim for 22 minutes per day (150 minutes per week! )   Nutrition: aim for calcium-rich and iron-rich foods as well as healthy sources of protein   Weight: ask your doctor what is the appropriate amount of weight for you to gain in pregnancy  We have nutritionists here if you would like to meet with them   Protect against the flu: get yourself and your entire household vaccinated against influenza  Tell your partner to get vaccinated as well  Good hand hygiene can reduce the spread of this potentially deadly virus  Insist that everyone who is going to hold or be around your baby get vaccinated   Learn about Preeclampsia: preeclampsia is a common, serious complication in pregnancy  A blood pressure of 140mmHg (top number or systolic) OR 39RHUN (bottom number or diastolic) is elevated and needs evaluation by your doctor  Ask your doctor early in pregnancy if you should take aspirin (not motrin or tylenol) to prevent preeclampsia  If you were advised to take aspirin to prevent preeclampsia, a daily dose of 162mg or 81mg is advised  One resource to learn more is www  preeclampsia org    If you smoke, try to reduce how many cigarettes you smoke or quit completely  Do not vape   Other warning signs to watch out for in pregnancy or postpartum: chest pain, obstructed breathing or shortness of breath, seizures, thoughts of hurting yourself or your baby, bleeding, a painful or swollen leg, fever, or headache (AWHONN POST-BIRTH Warning Signs campaign)    If these happen call 911  Itching is also not normal in pregnancy and if you experience this, especially over your hands and feet, potentially worse at night, notify your doctors

## 2020-03-16 NOTE — PROGRESS NOTES
Pt is a 30 y o   27w4d  Pregnancy is complicated by DVT, Factor V Leiden Heterozygote, low lying placenta  Pt reports +FM  Denies vb, lof, ctx  PTL precautions and fkc reviewed  Has follow up u/s today for low lying placenta  3d trimester lab slips given  F/U 2 weeks

## 2020-03-17 LAB
EXTERNAL HEMATOCRIT: 35.3 %
EXTERNAL HEMOGLOBIN: 11.6 G/DL
EXTERNAL PLATELET COUNT: 165 K/ΜL
EXTERNAL SYPHILIS RPR SCREEN: NORMAL

## 2020-03-18 ENCOUNTER — OB ABSTRACT (OUTPATIENT)
Dept: OBGYN CLINIC | Facility: CLINIC | Age: 30
End: 2020-03-18

## 2020-03-21 LAB — GLUCOSE 1H P 50 G GLC PO SERPL-MCNC: 163 MG/DL (ref 70–183)

## 2020-03-22 ENCOUNTER — OB ABSTRACT (OUTPATIENT)
Dept: LABOR AND DELIVERY | Facility: HOSPITAL | Age: 30
End: 2020-03-22

## 2020-04-03 ENCOUNTER — TELEMEDICINE (OUTPATIENT)
Dept: OBGYN CLINIC | Facility: CLINIC | Age: 30
End: 2020-04-03

## 2020-04-03 ENCOUNTER — TELEPHONE (OUTPATIENT)
Dept: OBGYN CLINIC | Facility: CLINIC | Age: 30
End: 2020-04-03

## 2020-04-03 DIAGNOSIS — D68.51 FACTOR V LEIDEN MUTATION AFFECTING PREGNANCY (HCC): ICD-10-CM

## 2020-04-03 DIAGNOSIS — O22.33 DVT COMPLICATING PREGNANCY, THIRD TRIMESTER: Primary | ICD-10-CM

## 2020-04-03 DIAGNOSIS — O99.119 FACTOR V LEIDEN MUTATION AFFECTING PREGNANCY (HCC): ICD-10-CM

## 2020-04-03 PROBLEM — O44.40 LOW-LYING PLACENTA: Status: RESOLVED | Noted: 2020-01-22 | Resolved: 2020-04-03

## 2020-04-03 PROCEDURE — PNV: Performed by: OBSTETRICS & GYNECOLOGY

## 2020-04-03 RX ORDER — ADHESIVE BANDAGE 3/4"
BANDAGE TOPICAL ONCE
Qty: 1 EACH | Refills: 0 | Status: SHIPPED | OUTPATIENT
Start: 2020-04-03 | End: 2020-04-03

## 2020-04-17 ENCOUNTER — ROUTINE PRENATAL (OUTPATIENT)
Dept: OBGYN CLINIC | Facility: CLINIC | Age: 30
End: 2020-04-17
Payer: COMMERCIAL

## 2020-04-17 VITALS
BODY MASS INDEX: 28.56 KG/M2 | HEART RATE: 97 BPM | SYSTOLIC BLOOD PRESSURE: 100 MMHG | HEIGHT: 69 IN | DIASTOLIC BLOOD PRESSURE: 60 MMHG | OXYGEN SATURATION: 97 % | WEIGHT: 192.8 LBS | TEMPERATURE: 97.8 F

## 2020-04-17 DIAGNOSIS — O22.33 DVT COMPLICATING PREGNANCY, THIRD TRIMESTER: Primary | ICD-10-CM

## 2020-04-17 DIAGNOSIS — Z23 NEED FOR TDAP VACCINATION: ICD-10-CM

## 2020-04-17 DIAGNOSIS — O99.810 ABNORMAL GLUCOSE TOLERANCE TEST (GTT) DURING PREGNANCY, ANTEPARTUM: ICD-10-CM

## 2020-04-17 PROCEDURE — 90471 IMMUNIZATION ADMIN: CPT

## 2020-04-17 PROCEDURE — PNV: Performed by: OBSTETRICS & GYNECOLOGY

## 2020-04-17 PROCEDURE — 90715 TDAP VACCINE 7 YRS/> IM: CPT

## 2020-04-25 LAB
GLUCOSE 1H P GLC SERPL-MCNC: 160 MG/DL
GLUCOSE 2H P 75 G GLC PO SERPL-MCNC: 137 MG/DL
GLUCOSE 3H P 100 G GLC PO SERPL-MCNC: 126 MG/DL
GLUCOSE P FAST SERPL-MCNC: 94 MG/DL

## 2020-05-01 ENCOUNTER — TELEMEDICINE (OUTPATIENT)
Dept: OBGYN CLINIC | Facility: CLINIC | Age: 30
End: 2020-05-01

## 2020-05-01 VITALS — BODY MASS INDEX: 28.06 KG/M2 | WEIGHT: 190 LBS

## 2020-05-01 DIAGNOSIS — O22.33 DVT COMPLICATING PREGNANCY, THIRD TRIMESTER: Primary | ICD-10-CM

## 2020-05-01 DIAGNOSIS — D68.51 FACTOR V LEIDEN MUTATION AFFECTING PREGNANCY (HCC): ICD-10-CM

## 2020-05-01 DIAGNOSIS — O99.810 ABNORMAL GLUCOSE TOLERANCE TEST (GTT) DURING PREGNANCY, ANTEPARTUM: ICD-10-CM

## 2020-05-01 DIAGNOSIS — O99.119 FACTOR V LEIDEN MUTATION AFFECTING PREGNANCY (HCC): ICD-10-CM

## 2020-05-01 PROCEDURE — PNV: Performed by: OBSTETRICS & GYNECOLOGY

## 2020-05-08 ENCOUNTER — ROUTINE PRENATAL (OUTPATIENT)
Dept: OBGYN CLINIC | Facility: CLINIC | Age: 30
End: 2020-05-08

## 2020-05-08 VITALS
WEIGHT: 196 LBS | DIASTOLIC BLOOD PRESSURE: 70 MMHG | OXYGEN SATURATION: 97 % | TEMPERATURE: 97.6 F | HEART RATE: 110 BPM | BODY MASS INDEX: 28.94 KG/M2 | SYSTOLIC BLOOD PRESSURE: 100 MMHG

## 2020-05-08 DIAGNOSIS — D68.51 HETEROZYGOUS FACTOR V LEIDEN MUTATION (HCC): ICD-10-CM

## 2020-05-08 DIAGNOSIS — O22.33 DVT COMPLICATING PREGNANCY, THIRD TRIMESTER: Primary | ICD-10-CM

## 2020-05-08 PROCEDURE — PNV: Performed by: OBSTETRICS & GYNECOLOGY

## 2020-05-11 LAB — EXTERNAL GROUP B STREP ANTIGEN: NEGATIVE

## 2020-05-15 ENCOUNTER — TELEMEDICINE (OUTPATIENT)
Dept: OBGYN CLINIC | Facility: CLINIC | Age: 30
End: 2020-05-15

## 2020-05-15 DIAGNOSIS — O22.33 DVT COMPLICATING PREGNANCY, THIRD TRIMESTER: Primary | ICD-10-CM

## 2020-05-15 DIAGNOSIS — D68.51 FACTOR V LEIDEN MUTATION AFFECTING PREGNANCY (HCC): ICD-10-CM

## 2020-05-15 DIAGNOSIS — O99.119 FACTOR V LEIDEN MUTATION AFFECTING PREGNANCY (HCC): ICD-10-CM

## 2020-05-15 PROCEDURE — PNV: Performed by: OBSTETRICS & GYNECOLOGY

## 2020-05-22 ENCOUNTER — TELEMEDICINE (OUTPATIENT)
Dept: OBGYN CLINIC | Facility: CLINIC | Age: 30
End: 2020-05-22

## 2020-05-22 DIAGNOSIS — O22.33 DVT COMPLICATING PREGNANCY, THIRD TRIMESTER: Primary | ICD-10-CM

## 2020-05-22 PROCEDURE — PNV: Performed by: OBSTETRICS & GYNECOLOGY

## 2020-05-28 ENCOUNTER — TELEPHONE (OUTPATIENT)
Dept: OBGYN CLINIC | Facility: CLINIC | Age: 30
End: 2020-05-28

## 2020-05-29 ENCOUNTER — ROUTINE PRENATAL (OUTPATIENT)
Dept: OBGYN CLINIC | Facility: CLINIC | Age: 30
End: 2020-05-29

## 2020-05-29 VITALS
OXYGEN SATURATION: 98 % | DIASTOLIC BLOOD PRESSURE: 70 MMHG | SYSTOLIC BLOOD PRESSURE: 120 MMHG | BODY MASS INDEX: 29.98 KG/M2 | HEART RATE: 118 BPM | TEMPERATURE: 97.7 F | WEIGHT: 203 LBS

## 2020-05-29 DIAGNOSIS — O22.33 DVT COMPLICATING PREGNANCY, THIRD TRIMESTER: Primary | ICD-10-CM

## 2020-05-29 PROCEDURE — PNV: Performed by: OBSTETRICS & GYNECOLOGY

## 2020-06-05 ENCOUNTER — ROUTINE PRENATAL (OUTPATIENT)
Dept: OBGYN CLINIC | Facility: CLINIC | Age: 30
End: 2020-06-05

## 2020-06-05 VITALS
WEIGHT: 207.8 LBS | HEART RATE: 99 BPM | OXYGEN SATURATION: 99 % | DIASTOLIC BLOOD PRESSURE: 68 MMHG | BODY MASS INDEX: 30.78 KG/M2 | TEMPERATURE: 97.2 F | HEIGHT: 69 IN | SYSTOLIC BLOOD PRESSURE: 122 MMHG

## 2020-06-05 DIAGNOSIS — O22.33 DVT COMPLICATING PREGNANCY, THIRD TRIMESTER: Primary | ICD-10-CM

## 2020-06-05 PROCEDURE — PNV: Performed by: OBSTETRICS & GYNECOLOGY

## 2020-06-06 ENCOUNTER — HOSPITAL ENCOUNTER (OUTPATIENT)
Dept: LABOR AND DELIVERY | Facility: HOSPITAL | Age: 30
Discharge: HOME | End: 2020-06-06
Payer: COMMERCIAL

## 2020-06-06 ENCOUNTER — HOSPITAL ENCOUNTER (INPATIENT)
Facility: HOSPITAL | Age: 30
LOS: 4 days | Discharge: HOME | End: 2020-06-10
Attending: OBSTETRICS & GYNECOLOGY | Admitting: OBSTETRICS & GYNECOLOGY
Payer: COMMERCIAL

## 2020-06-06 DIAGNOSIS — O99.119 FACTOR V LEIDEN MUTATION AFFECTING PREGNANCY (HCC): ICD-10-CM

## 2020-06-06 DIAGNOSIS — D68.51 FACTOR V LEIDEN MUTATION AFFECTING PREGNANCY (HCC): ICD-10-CM

## 2020-06-06 DIAGNOSIS — O22.33 DVT COMPLICATING PREGNANCY, THIRD TRIMESTER: Primary | ICD-10-CM

## 2020-06-06 DIAGNOSIS — O22.30 DVT (DEEP VEIN THROMBOSIS) IN PREGNANCY: ICD-10-CM

## 2020-06-06 DIAGNOSIS — Z3A.39 39 WEEKS GESTATION OF PREGNANCY: ICD-10-CM

## 2020-06-06 LAB
ERYTHROCYTE [DISTWIDTH] IN BLOOD BY AUTOMATED COUNT: 14.4 % (ref 11.6–15.1)
HCT VFR BLD AUTO: 36.8 % (ref 34.8–46.1)
HGB BLD-MCNC: 11.6 G/DL (ref 11.5–15.4)
MCH RBC QN AUTO: 29.8 PG (ref 26.8–34.3)
MCHC RBC AUTO-ENTMCNC: 31.5 G/DL (ref 31.4–37.4)
MCV RBC AUTO: 95 FL (ref 82–98)
PLATELET # BLD AUTO: 134 THOUSANDS/UL (ref 149–390)
PMV BLD AUTO: 10.7 FL (ref 8.9–12.7)
RBC # BLD AUTO: 3.89 MILLION/UL (ref 3.81–5.12)
WBC # BLD AUTO: 11.64 THOUSAND/UL (ref 4.31–10.16)

## 2020-06-06 PROCEDURE — 86901 BLOOD TYPING SEROLOGIC RH(D): CPT | Performed by: STUDENT IN AN ORGANIZED HEALTH CARE EDUCATION/TRAINING PROGRAM

## 2020-06-06 PROCEDURE — 86850 RBC ANTIBODY SCREEN: CPT | Performed by: STUDENT IN AN ORGANIZED HEALTH CARE EDUCATION/TRAINING PROGRAM

## 2020-06-06 PROCEDURE — 86592 SYPHILIS TEST NON-TREP QUAL: CPT | Performed by: STUDENT IN AN ORGANIZED HEALTH CARE EDUCATION/TRAINING PROGRAM

## 2020-06-06 PROCEDURE — 99024 POSTOP FOLLOW-UP VISIT: CPT | Performed by: OBSTETRICS & GYNECOLOGY

## 2020-06-06 PROCEDURE — 85027 COMPLETE CBC AUTOMATED: CPT | Performed by: STUDENT IN AN ORGANIZED HEALTH CARE EDUCATION/TRAINING PROGRAM

## 2020-06-06 PROCEDURE — 86900 BLOOD TYPING SEROLOGIC ABO: CPT | Performed by: STUDENT IN AN ORGANIZED HEALTH CARE EDUCATION/TRAINING PROGRAM

## 2020-06-06 RX ORDER — SODIUM CHLORIDE, SODIUM LACTATE, POTASSIUM CHLORIDE, CALCIUM CHLORIDE 600; 310; 30; 20 MG/100ML; MG/100ML; MG/100ML; MG/100ML
125 INJECTION, SOLUTION INTRAVENOUS CONTINUOUS
Status: DISCONTINUED | OUTPATIENT
Start: 2020-06-06 | End: 2020-06-08

## 2020-06-06 RX ORDER — ONDANSETRON 2 MG/ML
4 INJECTION INTRAMUSCULAR; INTRAVENOUS EVERY 6 HOURS PRN
Status: DISCONTINUED | OUTPATIENT
Start: 2020-06-06 | End: 2020-06-08

## 2020-06-06 RX ADMIN — MISOPROSTOL 25 MCG: 100 TABLET ORAL at 23:15

## 2020-06-07 ENCOUNTER — ANESTHESIA (INPATIENT)
Dept: ANESTHESIOLOGY | Facility: HOSPITAL | Age: 30
End: 2020-06-07
Payer: COMMERCIAL

## 2020-06-07 ENCOUNTER — ANESTHESIA EVENT (INPATIENT)
Dept: ANESTHESIOLOGY | Facility: HOSPITAL | Age: 30
End: 2020-06-07
Payer: COMMERCIAL

## 2020-06-07 PROBLEM — Z34.90 ENCOUNTER FOR ELECTIVE INDUCTION OF LABOR: Status: ACTIVE | Noted: 2020-06-07

## 2020-06-07 LAB
ABO GROUP BLD: NORMAL
BLD GP AB SCN SERPL QL: NEGATIVE
RH BLD: POSITIVE
SPECIMEN EXPIRATION DATE: NORMAL

## 2020-06-07 PROCEDURE — 4A1HXCZ MONITORING OF PRODUCTS OF CONCEPTION, CARDIAC RATE, EXTERNAL APPROACH: ICD-10-PCS | Performed by: OBSTETRICS & GYNECOLOGY

## 2020-06-07 PROCEDURE — 3E0P7VZ INTRODUCTION OF HORMONE INTO FEMALE REPRODUCTIVE, VIA NATURAL OR ARTIFICIAL OPENING: ICD-10-PCS | Performed by: OBSTETRICS & GYNECOLOGY

## 2020-06-07 PROCEDURE — 3E033VJ INTRODUCTION OF OTHER HORMONE INTO PERIPHERAL VEIN, PERCUTANEOUS APPROACH: ICD-10-PCS | Performed by: OBSTETRICS & GYNECOLOGY

## 2020-06-07 PROCEDURE — 10907ZC DRAINAGE OF AMNIOTIC FLUID, THERAPEUTIC FROM PRODUCTS OF CONCEPTION, VIA NATURAL OR ARTIFICIAL OPENING: ICD-10-PCS | Performed by: OBSTETRICS & GYNECOLOGY

## 2020-06-07 RX ORDER — ROPIVACAINE HYDROCHLORIDE 2 MG/ML
INJECTION, SOLUTION EPIDURAL; INFILTRATION; PERINEURAL
Status: DISPENSED
Start: 2020-06-07 | End: 2020-06-08

## 2020-06-07 RX ORDER — OXYTOCIN/RINGER'S LACTATE 30/500 ML
1-30 PLASTIC BAG, INJECTION (ML) INTRAVENOUS
Status: DISCONTINUED | OUTPATIENT
Start: 2020-06-07 | End: 2020-06-08

## 2020-06-07 RX ORDER — PROMETHAZINE HYDROCHLORIDE 25 MG/ML
12.5 INJECTION, SOLUTION INTRAMUSCULAR; INTRAVENOUS ONCE
Status: COMPLETED | OUTPATIENT
Start: 2020-06-07 | End: 2020-06-07

## 2020-06-07 RX ORDER — BUTORPHANOL TARTRATE 1 MG/ML
1 INJECTION, SOLUTION INTRAMUSCULAR; INTRAVENOUS ONCE
Status: COMPLETED | OUTPATIENT
Start: 2020-06-07 | End: 2020-06-07

## 2020-06-07 RX ORDER — ROPIVACAINE HYDROCHLORIDE 2 MG/ML
INJECTION, SOLUTION EPIDURAL; INFILTRATION; PERINEURAL
Status: COMPLETED | OUTPATIENT
Start: 2020-06-07 | End: 2020-06-07

## 2020-06-07 RX ADMIN — BUTORPHANOL TARTRATE 1 MG: 1 INJECTION, SOLUTION INTRAMUSCULAR; INTRAVENOUS at 10:00

## 2020-06-07 RX ADMIN — PROMETHAZINE HYDROCHLORIDE 12.5 MG: 25 INJECTION INTRAMUSCULAR; INTRAVENOUS at 09:59

## 2020-06-07 RX ADMIN — MISOPROSTOL 25 MCG: 100 TABLET ORAL at 06:01

## 2020-06-07 RX ADMIN — MISOPROSTOL 25 MCG: 100 TABLET ORAL at 02:32

## 2020-06-07 RX ADMIN — SODIUM CHLORIDE, SODIUM LACTATE, POTASSIUM CHLORIDE, AND CALCIUM CHLORIDE 125 ML/HR: .6; .31; .03; .02 INJECTION, SOLUTION INTRAVENOUS at 15:59

## 2020-06-07 RX ADMIN — SODIUM CHLORIDE, SODIUM LACTATE, POTASSIUM CHLORIDE, AND CALCIUM CHLORIDE 125 ML/HR: .6; .31; .03; .02 INJECTION, SOLUTION INTRAVENOUS at 00:49

## 2020-06-07 RX ADMIN — ROPIVACAINE HYDROCHLORIDE 10 ML: 2 INJECTION, SOLUTION EPIDURAL; INFILTRATION at 14:47

## 2020-06-07 RX ADMIN — SODIUM CHLORIDE, SODIUM LACTATE, POTASSIUM CHLORIDE, AND CALCIUM CHLORIDE 999 ML/HR: .6; .31; .03; .02 INJECTION, SOLUTION INTRAVENOUS at 14:32

## 2020-06-07 RX ADMIN — Medication 2 MILLI-UNITS/MIN: at 11:04

## 2020-06-07 RX ADMIN — SODIUM CHLORIDE, SODIUM LACTATE, POTASSIUM CHLORIDE, AND CALCIUM CHLORIDE 125 ML/HR: .6; .31; .03; .02 INJECTION, SOLUTION INTRAVENOUS at 08:41

## 2020-06-07 RX ADMIN — SODIUM CHLORIDE, SODIUM LACTATE, POTASSIUM CHLORIDE, AND CALCIUM CHLORIDE 125 ML/HR: .6; .31; .03; .02 INJECTION, SOLUTION INTRAVENOUS at 19:13

## 2020-06-08 LAB
BASE EXCESS BLDCOA CALC-SCNC: -2.8 MMOL/L (ref 3–11)
BASE EXCESS BLDCOV CALC-SCNC: 0.4 MMOL/L (ref 1–9)
HCO3 BLDCOA-SCNC: 23.2 MMOL/L (ref 17.3–27.3)
HCO3 BLDCOV-SCNC: 24 MMOL/L (ref 12.2–28.6)
O2 CT VFR BLDCOA CALC: 9.2 ML/DL
OXYHGB MFR BLDCOA: 36.4 %
OXYHGB MFR BLDCOV: 61.8 %
PCO2 BLDCOA: 44.4 MM[HG] (ref 30–60)
PCO2 BLDCOV: 36.6 MM HG (ref 27–43)
PH BLDCOA: 7.34 [PH] (ref 7.23–7.43)
PH BLDCOV: 7.43 [PH] (ref 7.19–7.49)
PO2 BLDCOA: 18.2 MM HG (ref 5–25)
PO2 BLDCOV: 25.8 MM HG (ref 15–45)
RPR SER QL: NORMAL
SAO2 % BLDCOV: 17.3 ML/DL

## 2020-06-08 PROCEDURE — 59400 OBSTETRICAL CARE: CPT | Performed by: OBSTETRICS & GYNECOLOGY

## 2020-06-08 PROCEDURE — 0KQM0ZZ REPAIR PERINEUM MUSCLE, OPEN APPROACH: ICD-10-PCS | Performed by: OBSTETRICS & GYNECOLOGY

## 2020-06-08 PROCEDURE — 82805 BLOOD GASES W/O2 SATURATION: CPT | Performed by: OBSTETRICS & GYNECOLOGY

## 2020-06-08 PROCEDURE — 99024 POSTOP FOLLOW-UP VISIT: CPT | Performed by: OBSTETRICS & GYNECOLOGY

## 2020-06-08 RX ORDER — CALCIUM CARBONATE 200(500)MG
1000 TABLET,CHEWABLE ORAL DAILY PRN
Status: DISCONTINUED | OUTPATIENT
Start: 2020-06-08 | End: 2020-06-10 | Stop reason: HOSPADM

## 2020-06-08 RX ORDER — ACETAMINOPHEN 325 MG/1
650 TABLET ORAL EVERY 6 HOURS SCHEDULED
Status: DISCONTINUED | OUTPATIENT
Start: 2020-06-08 | End: 2020-06-10 | Stop reason: HOSPADM

## 2020-06-08 RX ORDER — DIAPER,BRIEF,INFANT-TODD,DISP
1 EACH MISCELLANEOUS 4 TIMES DAILY PRN
Status: DISCONTINUED | OUTPATIENT
Start: 2020-06-08 | End: 2020-06-10 | Stop reason: HOSPADM

## 2020-06-08 RX ORDER — ONDANSETRON 2 MG/ML
4 INJECTION INTRAMUSCULAR; INTRAVENOUS EVERY 8 HOURS PRN
Status: DISCONTINUED | OUTPATIENT
Start: 2020-06-08 | End: 2020-06-10 | Stop reason: HOSPADM

## 2020-06-08 RX ORDER — DOCUSATE SODIUM 100 MG/1
100 CAPSULE, LIQUID FILLED ORAL 2 TIMES DAILY
Status: DISCONTINUED | OUTPATIENT
Start: 2020-06-08 | End: 2020-06-10 | Stop reason: HOSPADM

## 2020-06-08 RX ORDER — IBUPROFEN 600 MG/1
600 TABLET ORAL EVERY 6 HOURS SCHEDULED
Status: DISCONTINUED | OUTPATIENT
Start: 2020-06-08 | End: 2020-06-10 | Stop reason: HOSPADM

## 2020-06-08 RX ADMIN — DOCUSATE SODIUM 100 MG: 100 CAPSULE, LIQUID FILLED ORAL at 09:35

## 2020-06-08 RX ADMIN — WITCH HAZEL 1 PAD: 500 SOLUTION RECTAL; TOPICAL at 04:09

## 2020-06-08 RX ADMIN — ACETAMINOPHEN 650 MG: 325 TABLET ORAL at 19:12

## 2020-06-08 RX ADMIN — ENOXAPARIN SODIUM 80 MG: 80 INJECTION SUBCUTANEOUS at 14:53

## 2020-06-08 RX ADMIN — BENZOCAINE AND LEVOMENTHOL: 200; 5 SPRAY TOPICAL at 04:09

## 2020-06-08 RX ADMIN — ACETAMINOPHEN 650 MG: 325 TABLET ORAL at 09:36

## 2020-06-08 RX ADMIN — DOCUSATE SODIUM 100 MG: 100 CAPSULE, LIQUID FILLED ORAL at 19:12

## 2020-06-09 PROCEDURE — 99024 POSTOP FOLLOW-UP VISIT: CPT | Performed by: OBSTETRICS & GYNECOLOGY

## 2020-06-09 RX ORDER — ACETAMINOPHEN 325 MG/1
650 TABLET ORAL EVERY 6 HOURS SCHEDULED
Qty: 30 TABLET | Refills: 0 | Status: SHIPPED | OUTPATIENT
Start: 2020-06-09 | End: 2021-01-20

## 2020-06-09 RX ADMIN — ACETAMINOPHEN 650 MG: 325 TABLET ORAL at 00:05

## 2020-06-09 RX ADMIN — IBUPROFEN 600 MG: 600 TABLET ORAL at 20:19

## 2020-06-09 RX ADMIN — IBUPROFEN 600 MG: 600 TABLET ORAL at 00:04

## 2020-06-09 RX ADMIN — DOCUSATE SODIUM 100 MG: 100 CAPSULE, LIQUID FILLED ORAL at 09:18

## 2020-06-09 RX ADMIN — ENOXAPARIN SODIUM 80 MG: 80 INJECTION SUBCUTANEOUS at 02:55

## 2020-06-09 RX ADMIN — ACETAMINOPHEN 650 MG: 325 TABLET ORAL at 20:22

## 2020-06-09 RX ADMIN — IBUPROFEN 600 MG: 600 TABLET ORAL at 09:17

## 2020-06-09 RX ADMIN — ENOXAPARIN SODIUM 80 MG: 80 INJECTION SUBCUTANEOUS at 15:21

## 2020-06-10 VITALS
BODY MASS INDEX: 30.07 KG/M2 | SYSTOLIC BLOOD PRESSURE: 94 MMHG | HEART RATE: 76 BPM | TEMPERATURE: 98.1 F | HEIGHT: 69 IN | RESPIRATION RATE: 18 BRPM | WEIGHT: 203 LBS | OXYGEN SATURATION: 98 % | DIASTOLIC BLOOD PRESSURE: 61 MMHG

## 2020-06-10 PROCEDURE — 99024 POSTOP FOLLOW-UP VISIT: CPT | Performed by: OBSTETRICS & GYNECOLOGY

## 2020-06-10 RX ADMIN — DOCUSATE SODIUM 100 MG: 100 CAPSULE, LIQUID FILLED ORAL at 17:41

## 2020-06-10 RX ADMIN — ENOXAPARIN SODIUM 80 MG: 80 INJECTION SUBCUTANEOUS at 03:57

## 2020-06-10 RX ADMIN — DOCUSATE SODIUM 100 MG: 100 CAPSULE, LIQUID FILLED ORAL at 08:24

## 2020-06-10 RX ADMIN — IBUPROFEN 600 MG: 600 TABLET ORAL at 17:41

## 2020-06-10 RX ADMIN — ACETAMINOPHEN 650 MG: 325 TABLET ORAL at 17:41

## 2020-06-10 RX ADMIN — IBUPROFEN 600 MG: 600 TABLET ORAL at 11:26

## 2020-06-10 RX ADMIN — ACETAMINOPHEN 650 MG: 325 TABLET ORAL at 06:14

## 2020-06-10 RX ADMIN — IBUPROFEN 600 MG: 600 TABLET ORAL at 06:14

## 2020-06-10 RX ADMIN — ENOXAPARIN SODIUM 80 MG: 80 INJECTION SUBCUTANEOUS at 14:40

## 2020-06-10 RX ADMIN — ACETAMINOPHEN 650 MG: 325 TABLET ORAL at 11:26

## 2020-06-16 LAB — PLACENTA IN STORAGE: NORMAL

## 2020-07-17 ENCOUNTER — POSTPARTUM VISIT (OUTPATIENT)
Dept: OBGYN CLINIC | Facility: CLINIC | Age: 30
End: 2020-07-17
Payer: COMMERCIAL

## 2020-07-17 VITALS
TEMPERATURE: 97.7 F | BODY MASS INDEX: 28.11 KG/M2 | DIASTOLIC BLOOD PRESSURE: 64 MMHG | HEART RATE: 74 BPM | HEIGHT: 69 IN | WEIGHT: 189.8 LBS | SYSTOLIC BLOOD PRESSURE: 98 MMHG

## 2020-07-17 DIAGNOSIS — N76.0 BACTERIAL VAGINITIS: ICD-10-CM

## 2020-07-17 DIAGNOSIS — Z30.09 CONTRACEPTIVE EDUCATION: ICD-10-CM

## 2020-07-17 DIAGNOSIS — N89.8 VAGINAL DISCHARGE: ICD-10-CM

## 2020-07-17 DIAGNOSIS — B96.89 BACTERIAL VAGINITIS: ICD-10-CM

## 2020-07-17 PROBLEM — O99.810 ABNORMAL GLUCOSE TOLERANCE TEST (GTT) DURING PREGNANCY, ANTEPARTUM: Status: RESOLVED | Noted: 2020-04-17 | Resolved: 2020-07-17

## 2020-07-17 LAB
BV WHIFF TEST VAG QL: POSITIVE
CLUE CELLS SPEC QL WET PREP: ABNORMAL
PH SMN: ABNORMAL [PH]
SL AMB POCT WET MOUNT: ABNORMAL
T VAGINALIS VAG QL WET PREP: NEGATIVE
YEAST VAG QL WET PREP: NEGATIVE

## 2020-07-17 PROCEDURE — 99024 POSTOP FOLLOW-UP VISIT: CPT | Performed by: OBSTETRICS & GYNECOLOGY

## 2020-07-17 PROCEDURE — 87210 SMEAR WET MOUNT SALINE/INK: CPT | Performed by: OBSTETRICS & GYNECOLOGY

## 2020-07-17 RX ORDER — METRONIDAZOLE 500 MG/1
500 TABLET ORAL EVERY 12 HOURS SCHEDULED
Qty: 14 TABLET | Refills: 0 | Status: SHIPPED | OUTPATIENT
Start: 2020-07-17 | End: 2020-07-24

## 2020-07-17 RX ORDER — ACETAMINOPHEN AND CODEINE PHOSPHATE 120; 12 MG/5ML; MG/5ML
1 SOLUTION ORAL DAILY
Qty: 28 TABLET | Refills: 3 | Status: SHIPPED | OUTPATIENT
Start: 2020-07-17 | End: 2020-11-05 | Stop reason: SDUPTHER

## 2020-07-17 NOTE — PROGRESS NOTES
Patient is a 35 y o   with Patient's last menstrual period was 2019 (exact date)  who presents for post partum examination s/p  on 2020  Pt underwent IOL secondary to  DVT in pregnancy with Lovenox management  Pt is without complaints  She reports overall she is feeling well  She reports her lochia has stopped and she has not had a menses as of yet  Pt reports she is Breast feeding  Pt denies si/sx of ppd and scored a 0 on her ppd-E today  Pt reports she has been sexually active and notes vaginal dryness  She reports her  used withdrawal for contraception  She desires POP for contraception  We reviewed with her h o DVT in pregnancy and dx of Factor V Leiden heterozygote, she should never use estrogen containing medications  Pt has 6 more weeks of anticoagulation and reports she thinks she has enough lovenox, but will call if she needs more  Additionally she reports a yellow bad smelling discharge for the last few days and is concerned she has an infection  She has not tried an otc medications, denies fevers, chills or urinary symptoms         Past Medical History:   Diagnosis Date    Abnormal glucose tolerance test (GTT) during pregnancy, antepartum     DVT complicating pregnancy, first trimester 2019    Low-lying placenta 2020    Varicella        Past Surgical History:   Procedure Laterality Date    WISDOM TOOTH EXTRACTION         OB History    Para Term  AB Living   1 1 1     1   SAB TAB Ectopic Multiple Live Births         0 1      # Outcome Date GA Lbr Cory/2nd Weight Sex Delivery Anes PTL Lv   1 Term 20 39w4d / 01:07 3800 g (8 lb 6 oz) M Vag-Spont EPI  CARMEN           Current Outpatient Medications:     acetaminophen (TYLENOL) 325 mg tablet, Take 2 tablets (650 mg total) by mouth every 6 (six) hours, Disp: 30 tablet, Rfl: 0    enoxaparin (LOVENOX) 80 mg/0 8 mL, Inject 0 8 mL (80 mg total) under the skin every 12 (twelve) hours, Disp: 67 2 mL, Rfl: 0    folic acid (FOLVITE) 1 mg tablet, Take by mouth daily, Disp: , Rfl:     Prenatal MV & Min w/FA-DHA (PRENATAL ADULT GUMMY/DHA/FA) 0 4-25 MG CHEW, Chew, Disp: , Rfl:     metroNIDAZOLE (FLAGYL) 500 mg tablet, Take 1 tablet (500 mg total) by mouth every 12 (twelve) hours for 7 days, Disp: 14 tablet, Rfl: 0    norethindrone (MICRONOR) 0 35 MG tablet, Take 1 tablet (0 35 mg total) by mouth daily, Disp: 28 tablet, Rfl: 3    No Known Allergies    Social History     Socioeconomic History    Marital status: /Civil Union     Spouse name: None    Number of children: None    Years of education: None    Highest education level: None   Occupational History    None   Social Needs    Financial resource strain: None    Food insecurity:     Worry: None     Inability: None    Transportation needs:     Medical: None     Non-medical: None   Tobacco Use    Smoking status: Never Smoker    Smokeless tobacco: Never Used   Substance and Sexual Activity    Alcohol use: Never     Frequency: Never    Drug use: Never    Sexual activity: Yes     Partners: Male     Birth control/protection: None   Lifestyle    Physical activity:     Days per week: None     Minutes per session: None    Stress: None   Relationships    Social connections:     Talks on phone: None     Gets together: None     Attends Sikhism service: None     Active member of club or organization: None     Attends meetings of clubs or organizations: None     Relationship status: None    Intimate partner violence:     Fear of current or ex partner: None     Emotionally abused: None     Physically abused: None     Forced sexual activity: None   Other Topics Concern    None   Social History Narrative    None       Family History   Problem Relation Age of Onset    Heart disease Mother     No Known Problems Father     Diabetes Maternal Grandfather     No Known Problems Sister     No Known Problems Brother     No Known Problems Brother  Breast cancer Neg Hx     Colon cancer Neg Hx     Ovarian cancer Neg Hx     Uterine cancer Neg Hx        Review of Systems   Constitutional: Negative for chills, fatigue, fever and unexpected weight change  HENT: Negative for congestion, mouth sores and sore throat  Respiratory: Negative for cough, chest tightness, shortness of breath and wheezing  Cardiovascular: Negative for chest pain and palpitations  Gastrointestinal: Negative for abdominal distention, abdominal pain, constipation, diarrhea, nausea and vomiting  Endocrine: Negative for cold intolerance and heat intolerance  Genitourinary: Positive for vaginal discharge (malodorous, yellow)  Negative for dyspareunia, dysuria, genital sores, menstrual problem, pelvic pain, vaginal bleeding and vaginal pain  Musculoskeletal: Negative for arthralgias  Skin: Negative for color change and rash  Neurological: Negative for dizziness, light-headedness and headaches  Hematological: Negative for adenopathy  Blood pressure 98/64, pulse 74, temperature 97 7 °F (36 5 °C), temperature source Tympanic, height 5' 9" (1 753 m), weight 86 1 kg (189 lb 12 8 oz), last menstrual period 09/05/2019, currently breastfeeding  and Body mass index is 28 03 kg/m²  Physical Exam   Constitutional: She is oriented to person, place, and time  She appears well-developed and well-nourished  HENT:   Head: Normocephalic and atraumatic  Eyes: Conjunctivae and EOM are normal    Neck: Normal range of motion  Neck supple  No tracheal deviation present  No thyromegaly present  Cardiovascular: Normal rate, regular rhythm and normal heart sounds  Pulmonary/Chest: Effort normal and breath sounds normal  No stridor  No respiratory distress  She has no wheezes  She has no rales  Abdominal: Soft  Bowel sounds are normal  She exhibits no distension and no mass  There is no tenderness  There is no rebound and no guarding     Musculoskeletal: Normal range of motion  She exhibits no edema or tenderness  Lymphadenopathy:     She has no cervical adenopathy  Neurological: She is alert and oriented to person, place, and time  Skin: Skin is warm  No rash noted  No erythema  Psychiatric: She has a normal mood and affect  Her behavior is normal  Judgment and thought content normal      Breasts: breasts appear normal, no suspicious masses, no skin or nipple changes or axillary nodes, lactating, no erythema or tenderness, nipples normal      vulva: normal external genitalia for age and no lesions, masses, epithelial changes, or exudate  vagina: color pink, rugae  flattening of rugae and discharge  yellow and odiferous  cervix: parous and no lesions   uterus: NSSC, AF, NT, mobile  adnexa: no masses or tenderness    Wet mount/koh: BV    A/P:  Pt is a 35 y o   with      Janeth was seen today for postpartum care  Diagnoses and all orders for this visit:    Postpartum care and examination of lactating mother    Contraceptive education  -     norethindrone (MICRONOR) 0 35 MG tablet; Take 1 tablet (0 35 mg total) by mouth daily    Bacterial vaginitis  -     POCT wet mount  -     metroNIDAZOLE (FLAGYL) 500 mg tablet;  Take 1 tablet (500 mg total) by mouth every 12 (twelve) hours for 7 days    Vaginal discharge  -     POCT wet mount

## 2020-11-02 ENCOUNTER — TELEPHONE (OUTPATIENT)
Dept: OBGYN CLINIC | Facility: CLINIC | Age: 30
End: 2020-11-02

## 2020-11-02 DIAGNOSIS — Z30.09 CONTRACEPTIVE EDUCATION: ICD-10-CM

## 2020-11-05 RX ORDER — ACETAMINOPHEN AND CODEINE PHOSPHATE 120; 12 MG/5ML; MG/5ML
1 SOLUTION ORAL DAILY
Qty: 28 TABLET | Refills: 2 | Status: SHIPPED | OUTPATIENT
Start: 2020-11-05 | End: 2021-01-20 | Stop reason: SDUPTHER

## 2021-01-13 ENCOUNTER — TELEPHONE (OUTPATIENT)
Dept: OBGYN CLINIC | Facility: CLINIC | Age: 31
End: 2021-01-13

## 2021-01-13 NOTE — TELEPHONE ENCOUNTER
pts  called in regards to appointment cancellation for 1/15/21  He states that he took the day off for this appointment, he expressed that this is "not good" and he would like to speak yo Dr Lori Huff  I offered appointment with Ara Sharma but he refused

## 2021-01-13 NOTE — TELEPHONE ENCOUNTER
I called the patient and LMOM in Korean explaining that when they made their appointment the call schedule was not yet assigned  I let them know that I am on call at the hospital and not in the office on 1/15 and if that is the only day they can come, they are more than welcome to see Nimco Thompson who will be in the office  Otherwise I would be happy to see them on a day that I am present in the office

## 2021-01-20 ENCOUNTER — ANNUAL EXAM (OUTPATIENT)
Dept: OBGYN CLINIC | Facility: CLINIC | Age: 31
End: 2021-01-20
Payer: COMMERCIAL

## 2021-01-20 VITALS
SYSTOLIC BLOOD PRESSURE: 100 MMHG | WEIGHT: 199.2 LBS | BODY MASS INDEX: 29.51 KG/M2 | DIASTOLIC BLOOD PRESSURE: 60 MMHG | HEIGHT: 69 IN

## 2021-01-20 DIAGNOSIS — E58 DIETARY CALCIUM DEFICIENCY: ICD-10-CM

## 2021-01-20 DIAGNOSIS — Z72.3 INADEQUATE EXERCISE: ICD-10-CM

## 2021-01-20 DIAGNOSIS — Z30.41 ORAL CONTRACEPTIVE PILL SURVEILLANCE: ICD-10-CM

## 2021-01-20 DIAGNOSIS — Z01.419 ENCOUNTER FOR ANNUAL ROUTINE GYNECOLOGICAL EXAMINATION: Primary | ICD-10-CM

## 2021-01-20 PROCEDURE — S0612 ANNUAL GYNECOLOGICAL EXAMINA: HCPCS | Performed by: OBSTETRICS & GYNECOLOGY

## 2021-01-20 RX ORDER — ACETAMINOPHEN AND CODEINE PHOSPHATE 120; 12 MG/5ML; MG/5ML
1 SOLUTION ORAL DAILY
Qty: 28 TABLET | Refills: 12 | Status: SHIPPED | OUTPATIENT
Start: 2021-01-20 | End: 2022-02-23

## 2021-01-20 NOTE — PROGRESS NOTES
Pt is a 35 y o   with No LMP recorded  using abstinence and POP for [facility] presents for preventive care  She notes the same partner since her last STI evaluation  In her lifetime she has been involved with 1 partner   Safe sexual practices (monogomy, condoms) are followed consistently  · She does  feel safe in the relationship  She does feel safe in her home  · Her calcium intake encompasses milk (cow, goat, almond, cashew, soy, etc), cheese and yogurt for a total of 2-3 servings daily on average  She does not take additional Vitamin D (MVI or supplement)  · She exercises seasonally times per week  · Her menses have ceased with breast feeding  She reports irregular spotting last month, so she discontinued OCPs so she could get her period, but it never came  Reviewed she may have this as baby's feeding patterns change now that he has started solids  Pt will restart her POPs  ·   Menstrual History:  OB History        1    Para   1    Term   1            AB        Living   1       SAB        TAB        Ectopic        Multiple   0    Live Births   1           Obstetric Comments   Menarche: 17    Menses: none with breast feeding  Menarche age: 13  No LMP recorded  ·      · She has never recieved an HPV vaccine and does not desire to begin or continue the HPV vaccination series    · tobacco use : does not use tobacco              · Colonoscopy/mammogram: not indicated  · Pap:  2019-wnl, repeat 3 years from last    Past Medical History:   Diagnosis Date    Abnormal glucose tolerance test (GTT) during pregnancy, antepartum     DVT complicating pregnancy, first trimester 2019    Low-lying placenta 2020    Varicella        Past Surgical History:   Procedure Laterality Date    WISDOM TOOTH EXTRACTION         OB History    Para Term  AB Living   1 1 1     1   SAB TAB Ectopic Multiple Live Births         0 1      # Outcome Date GA Lbr Cory/2nd Weight Sex Delivery Anes PTL Lv   1 Term 06/08/20 39w4d / 01:07 3800 g (8 lb 6 oz) M Vag-Spont EPI  CARMEN      Obstetric Comments   Menarche: 17      Menses: none with breast feeding  Current Outpatient Medications:     norethindrone (MICRONOR) 0 35 MG tablet, Take 1 tablet (0 35 mg total) by mouth daily, Disp: 28 tablet, Rfl: 12    No Known Allergies    Social History     Socioeconomic History    Marital status: /Civil Union     Spouse name: Gerry Manzo Number of children: 1    Years of education: None    Highest education level:  Bachelor's degree (e g , BA, AB, BS)   Occupational History    Occupation: homemaker   Social Needs    Financial resource strain: None    Food insecurity     Worry: None     Inability: None    Transportation needs     Medical: None     Non-medical: None   Tobacco Use    Smoking status: Never Smoker    Smokeless tobacco: Never Used   Substance and Sexual Activity    Alcohol use: Never     Frequency: Never    Drug use: Never    Sexual activity: Yes     Partners: Male     Birth control/protection: Abstinence     Comment: lifetime partners:1   Lifestyle    Physical activity     Days per week: None     Minutes per session: None    Stress: None   Relationships    Social connections     Talks on phone: None     Gets together: None     Attends Temple service: None     Active member of club or organization: None     Attends meetings of clubs or organizations: None     Relationship status: None    Intimate partner violence     Fear of current or ex partner: None     Emotionally abused: None     Physically abused: None     Forced sexual activity: None   Other Topics Concern    None   Social History Narrative    Alevism: Hinduism [employer] blood products        Exercise: walking seasonally    Calcium: 1 c milk daily, 1 cheese daily, 1 yogurt 2-3x/week,        Family History   Problem Relation Age of Onset    Heart disease Mother     No Known Problems Father     Diabetes Maternal Grandfather     No Known Problems Sister     No Known Problems Brother     No Known Problems Brother     Breast cancer Neg Hx     Colon cancer Neg Hx     Ovarian cancer Neg Hx     Uterine cancer Neg Hx        Blood pressure 100/60, height 5' 9" (1 753 m), weight 90 4 kg (199 lb 3 2 oz), currently breastfeeding  and Body mass index is 29 42 kg/m²  Physical Exam  Constitutional:       General: She is not in acute distress  Appearance: Normal appearance  She is well-developed  She is not ill-appearing  HENT:      Head: Normocephalic and atraumatic  Eyes:      Extraocular Movements: Extraocular movements intact  Conjunctiva/sclera: Conjunctivae normal    Neck:      Musculoskeletal: Normal range of motion and neck supple  Thyroid: No thyromegaly  Trachea: No tracheal deviation  Cardiovascular:      Rate and Rhythm: Normal rate and regular rhythm  Heart sounds: Normal heart sounds  Pulmonary:      Effort: Pulmonary effort is normal  No respiratory distress  Breath sounds: Normal breath sounds  No stridor  No wheezing or rales  Abdominal:      General: Bowel sounds are normal  There is no distension  Palpations: Abdomen is soft  There is no mass  Tenderness: There is no abdominal tenderness  There is no guarding or rebound  Hernia: No hernia is present  Musculoskeletal: Normal range of motion  General: No tenderness  Lymphadenopathy:      Cervical: No cervical adenopathy  Skin:     General: Skin is warm  Findings: No erythema or rash  Neurological:      Mental Status: She is alert and oriented to person, place, and time  Psychiatric:         Mood and Affect: Mood normal          Behavior: Behavior normal          Thought Content:  Thought content normal          Judgment: Judgment normal          Breasts: breasts appear normal, no suspicious masses, no skin or nipple changes or axillary nodes, lactating, no erythema or tenderness, nipples normal     vulva: normal external genitalia for age and no lesions, masses, epithelial changes, or exudate  vagina: color pink and rugae  well formed rugae  cervix: parous and no lesions   uterus: NSSC, AF, NT, mobile  adnexa: no masses or tenderness      A/P:  Pt is a 35 y o   with      Diagnoses and all orders for this visit:    Encounter for annual routine gynecological examination  -pap up to date    Oral contraceptive pill surveillance  -     norethindrone (MICRONOR) 0 35 MG tablet; Take 1 tablet (0 35 mg total) by mouth daily    Dietary calcium deficiency  Patient advised recommendation of daily dietary calcium of 1000 mg calcium  Inadequate exercise  Patient advised recommendation of exercise 5 times per week for 30 minutes  BMI 29 0-29 9,adult  Patient advised recommendation of BMI to be between 19-25

## 2022-02-23 ENCOUNTER — ANNUAL EXAM (OUTPATIENT)
Dept: OBGYN CLINIC | Facility: CLINIC | Age: 32
End: 2022-02-23
Payer: COMMERCIAL

## 2022-02-23 VITALS
WEIGHT: 210.6 LBS | SYSTOLIC BLOOD PRESSURE: 118 MMHG | DIASTOLIC BLOOD PRESSURE: 70 MMHG | HEIGHT: 69 IN | BODY MASS INDEX: 31.19 KG/M2

## 2022-02-23 DIAGNOSIS — O22.30 DVT (DEEP VEIN THROMBOSIS) IN PREGNANCY: ICD-10-CM

## 2022-02-23 DIAGNOSIS — Z01.411 ENCOUNTER FOR GYNECOLOGICAL EXAMINATION WITH ABNORMAL FINDING: Primary | ICD-10-CM

## 2022-02-23 DIAGNOSIS — D68.51 HETEROZYGOUS FACTOR V LEIDEN MUTATION (HCC): ICD-10-CM

## 2022-02-23 DIAGNOSIS — N89.8 VAGINAL ODOR: ICD-10-CM

## 2022-02-23 PROCEDURE — 87510 GARDNER VAG DNA DIR PROBE: CPT | Performed by: NURSE PRACTITIONER

## 2022-02-23 PROCEDURE — S0612 ANNUAL GYNECOLOGICAL EXAMINA: HCPCS | Performed by: NURSE PRACTITIONER

## 2022-02-23 PROCEDURE — 87660 TRICHOMONAS VAGIN DIR PROBE: CPT | Performed by: NURSE PRACTITIONER

## 2022-02-23 PROCEDURE — 87480 CANDIDA DNA DIR PROBE: CPT | Performed by: NURSE PRACTITIONER

## 2022-02-23 NOTE — PROGRESS NOTES
Assessment / Plan    1  Encounter for gynecological examination with abnormal finding  Well woman exam  2019 pap negative  Plan repeat next year  Not using contraception, desires pregnancy  Recommend preconception counseling with MFM due to h/o DVT in past pregnancy and dx of factor V carrier  2  DVT (deep vein thrombosis) in pregnancy    - Ambulatory Referral to Maternal Fetal Medicine; Future    3  Heterozygous factor V Leiden mutation [facility])    - Ambulatory Referral to Maternal Fetal Medicine; Future    4  Vaginal odor  Affirm taken will treat accordingly    - VAGINOSIS DNA PROBE (AFFIRM)        Subjective      Janeth Franco is a 28 y o  female who presents for her annual gynecologic exam     , presenting for routine gyn exam   She is Malay speaking only and [employer] translation service was utilized  Last pap: 2019  States that she stopped taking progesterone only pill after two months  States that she would like to conceive again  Hx DVT and heterozygous for fx V-- this was diagnosed during her first pregnancy  Discussed benefit of meeting with MFM for preconception counseling  She agrees  Periods are regular   Current contraception: none  History of abnormal Pap smear: no  Family history of breast,uterine, ovarian or colon cancer: no      Menstrual History:  OB History        1    Para   1    Term   1       0    AB   0    Living   1       SAB   0    IAB   0    Ectopic   0    Multiple   0    Live Births   1           Obstetric Comments   Menarche: 17    Menses: regular, monthly              Patient's last menstrual period was 2022  The following portions of the patient's history were reviewed and updated as appropriate: allergies, current medications, past family history, past medical history, past social history, past surgical history and problem list     Review of Systems      Review of Systems   Constitutional: Negative for chills and fever  Respiratory: Negative for cough and shortness of breath  Gastrointestinal: Negative for abdominal distention, abdominal pain, blood in stool, constipation, diarrhea, nausea and vomiting  Genitourinary: Negative for difficulty urinating, dysuria, frequency, genital sores, hematuria, menstrual problem, pelvic pain, urgency, vaginal bleeding and vaginal discharge  Vaginal odor   Musculoskeletal: Negative for arthralgias and myalgias  Breasts:  Negative for skin changes, dimpling, asymmetry, nipple discharge, redness, tenderness or palpable masses    Objective      /70 (BP Location: Right arm, Patient Position: Sitting, Cuff Size: Standard)   Ht 5' 9" (1 753 m)   Wt 95 5 kg (210 lb 9 6 oz)   LMP 01/26/2022   BMI 31 10 kg/m²   Physical Exam  Constitutional:       General: She is not in acute distress  Appearance: Normal appearance  She is well-developed  She is not ill-appearing or diaphoretic  Comments: BMI 31   HENT:      Head: Normocephalic and atraumatic  Eyes:      Pupils: Pupils are equal, round, and reactive to light  Neck:      Thyroid: No thyromegaly  Pulmonary:      Effort: Pulmonary effort is normal    Chest:   Breasts: Breasts are symmetrical       Right: No inverted nipple, mass, nipple discharge, skin change, tenderness or supraclavicular adenopathy  Left: No inverted nipple, mass, nipple discharge, skin change, tenderness or supraclavicular adenopathy  Abdominal:      General: There is no distension  Palpations: Abdomen is soft  There is no mass  Tenderness: There is no abdominal tenderness  There is no guarding or rebound  Genitourinary:     General: Normal vulva  Exam position: Lithotomy position  Labia:         Right: No rash, tenderness, lesion or injury  Left: No rash, tenderness, lesion or injury  Vagina: No signs of injury and foreign body   No vaginal discharge (scant to none; odorless), erythema, tenderness or bleeding  Cervix: No cervical motion tenderness, discharge or friability  Uterus: Not enlarged and not tender  Adnexa:         Right: No mass or tenderness  Left: No mass or tenderness  Musculoskeletal:      Cervical back: Neck supple  Lymphadenopathy:      Cervical: No cervical adenopathy  Upper Body:      Right upper body: No supraclavicular adenopathy  Left upper body: No supraclavicular adenopathy  Skin:     General: Skin is warm and dry  Neurological:      General: No focal deficit present  Mental Status: She is alert and oriented to person, place, and time  Psychiatric:         Mood and Affect: Mood normal          Behavior: Behavior normal          Thought Content:  Thought content normal          Judgment: Judgment normal

## 2022-02-25 LAB
CANDIDA RRNA VAG QL PROBE: NEGATIVE
G VAGINALIS RRNA GENITAL QL PROBE: NEGATIVE
T VAGINALIS RRNA GENITAL QL PROBE: NEGATIVE

## 2022-03-07 ENCOUNTER — TELEMEDICINE (OUTPATIENT)
Dept: PERINATAL CARE | Facility: CLINIC | Age: 32
End: 2022-03-07
Payer: COMMERCIAL

## 2022-03-07 DIAGNOSIS — Z31.69 PRE-CONCEPTION COUNSELING: Primary | ICD-10-CM

## 2022-03-07 DIAGNOSIS — O22.30 DVT (DEEP VEIN THROMBOSIS) IN PREGNANCY: ICD-10-CM

## 2022-03-07 DIAGNOSIS — D68.51 HETEROZYGOUS FACTOR V LEIDEN MUTATION (HCC): ICD-10-CM

## 2022-03-07 PROCEDURE — 99212 OFFICE O/P EST SF 10 MIN: CPT | Performed by: OBSTETRICS & GYNECOLOGY

## 2022-03-07 NOTE — LETTER
2022     [place], [address] [facility]   [address]  [id number]    Patient: Maria Antonia Iglesias   YOB: 1990   Date of Visit: 3/7/2022       Dear Dr Pruett Pindea: Thank you for referring Janeth Joan to me for evaluation  Below are my notes for this consultation  If you have questions, please do not hesitate to call me  I look forward to following your patient along with you  Sincerely,         Center Virtual Visit        CC: No Recipients  Shanique Cardona MD  3/7/2022  9:35 AM  Sign when Signing Visit  Inability to connect to telemedicine video; therefore, I called and spoke to the  who speaks [place]  We discussed her history of a DVT just prior to her previous pregnancy which she was managed with therapeutic Lovenox throughout the duration of her pregnancy  She overall did well  She is found to be heterozygote for factor 5 Leiden and does not require long-term anticoagulation  We discussed the increased risks of venous thromboembolism during pregnancy and given her history of a low risk thrombophilia and personal history of venous thromboembolism, I recommend intermediate dosing with Lovenox 40 mg twice a day to be started upon knowledge of the pregnancy  I also discussed the importance of taking a prenatal vitamins pre conceptually  All questions were answered to apparent satisfaction  Please note I spent 5 minutes reviewing records prior to the visit, 5 minutes in patient contact via telephone including counseling and coordination of care, and 5 minutes in charting in the electronic medical record  Total time spent for the encounter is 15 minutes

## 2022-03-07 NOTE — PROGRESS NOTES
Inability to connect to telemedicine video; therefore, I called and spoke to the  who speaks [place]  We discussed her history of a DVT just prior to her previous pregnancy which she was managed with therapeutic Lovenox throughout the duration of her pregnancy  She overall did well  She is found to be heterozygote for factor 5 Leiden and does not require long-term anticoagulation  We discussed the increased risks of venous thromboembolism during pregnancy and given her history of a low risk thrombophilia and personal history of venous thromboembolism, I recommend intermediate dosing with Lovenox 40 mg twice a day to be started upon knowledge of the pregnancy  I also discussed the importance of taking a prenatal vitamins pre conceptually  All questions were answered to apparent satisfaction  Please note I spent 5 minutes reviewing records prior to the visit, 5 minutes in patient contact via telephone including counseling and coordination of care, and 5 minutes in charting in the electronic medical record  Total time spent for the encounter is 15 minutes

## 2022-12-05 ENCOUNTER — TELEPHONE (OUTPATIENT)
Dept: OBGYN CLINIC | Facility: CLINIC | Age: 32
End: 2022-12-05

## 2022-12-05 ENCOUNTER — OFFICE VISIT (OUTPATIENT)
Dept: OBGYN CLINIC | Facility: CLINIC | Age: 32
End: 2022-12-05

## 2022-12-05 VITALS
DIASTOLIC BLOOD PRESSURE: 70 MMHG | HEIGHT: 69 IN | BODY MASS INDEX: 30.66 KG/M2 | SYSTOLIC BLOOD PRESSURE: 112 MMHG | WEIGHT: 207 LBS

## 2022-12-05 DIAGNOSIS — Z31.41 FERTILITY TESTING: ICD-10-CM

## 2022-12-05 DIAGNOSIS — N89.8 VAGINAL DISCHARGE: ICD-10-CM

## 2022-12-05 DIAGNOSIS — N92.6 IRREGULAR MENSES: Primary | ICD-10-CM

## 2022-12-05 DIAGNOSIS — N89.8 VAGINAL ODOR: ICD-10-CM

## 2022-12-05 RX ORDER — PNV NO.95/FERROUS FUM/FOLIC AC 28MG-0.8MG
TABLET ORAL
COMMUNITY

## 2022-12-05 NOTE — PROGRESS NOTES
Patient is a 33 y o  Yoselin Dietrich with Patient's last menstrual period was 2022 (exact date)  who presents requesting evaluation of irregular menses  She presents with her  who reports that they became irregular in July this summer  She reports that they are becoming further and further apart and she is concerned because she desires pregnancy  She reports she records her menses and since March her menses have been:   3/30  5/8 (39 d)   (34 d)  7/15 (34 d)   (51)  10/5 (31)   (51)    She reports that her menses were typically every 35 days  She reports her menses last 4 days, even when they are irregular  She reports she uses a regular pad every 8 hours  She reports that she is attempting pregnancy and has not achieved pregnancy for the last 2 years  She reports that they have been having intercourse day 14-20 of her menses  Her  reports that they saw a hematologist/oncologist to make sure it is safe  They were advised that in a future pregnancy they would require prophylatic lovenox due to her history of DVT in her last pregnancy  They desires an infertility workup and she is concerned that maybe she has insulin resistance  Prior to examination, pt reports that she has a light yellow dishcharge with an odor that she desires to have investigated  affirm collected at time of examination          Past Medical History:   Diagnosis Date   • Abnormal glucose tolerance test (GTT) during pregnancy, antepartum    • DVT complicating pregnancy, first trimester 2019   • Low-lying placenta 2020   • Varicella        Past Surgical History:   Procedure Laterality Date   • WISDOM TOOTH EXTRACTION         OB History    Para Term  AB Living   1 1 1 0 0 1   SAB IAB Ectopic Multiple Live Births   0 0 0 0 1      # Outcome Date GA Lbr Cory/2nd Weight Sex Delivery Anes PTL Lv   1 Term 20 39w4d / 01:07 3800 g (8 lb 6 oz) M Vag-Spont EPI  CARMEN Complications: DVT (deep vein thrombosis) in pregnancy, Factor [facility] Le[facility]      Obstetric Comments   Menarche: 17      Menses: regular, monthly           Current Outpatient Medications:   •  Prenatal Vit-Fe Fumarate-FA (Prenatal Vitamin) 27-0 8 MG TABS, Take by mouth, Disp: , Rfl:     No Known Allergies    Social History     Socioeconomic History   • Marital status: /Civil Union     Spouse name: Darci Eng   • Number of children: 1   • Years of education: None   • Highest education level: Bachelor's degree (e g , BA, AB, BS)   Occupational History   • Occupation: homemaker   Tobacco Use   • Smoking status: Never   • Smokeless tobacco: Never   Vaping Use   • Vaping Use: Never used   Substance and Sexual Activity   • Alcohol use: Never   • Drug use: Never   • Sexual activity: Yes     Partners: Male     Birth control/protection: None     Comment: lifetime partners:1   Other Topics Concern   • None   Social History Narrative    Adventism: Sikhism [employer] blood products        Exercise: walking seasonally    Calcium: 1 c milk daily, 1 cheese daily, 1 yogurt 2-3x/week,      Social Determinants of Health     Financial Resource Strain: Not on file   Food Insecurity: Not on file   Transportation Needs: Not on file   Physical Activity: Not on file   Stress: Not on file   Social Connections: Not on file   Intimate Partner Violence: Not on file   Housing Stability: Not on file       Family History   Problem Relation Age of Onset   • Heart disease Mother    • No Known Problems Father    • Diabetes Maternal Grandfather    • No Known Problems Sister    • No Known Problems Brother    • No Known Problems Brother    • Breast cancer Neg Hx    • Colon cancer Neg Hx    • Ovarian cancer Neg Hx    • Uterine cancer Neg Hx        Review of Systems   Constitutional: Negative for chills, fatigue, fever and unexpected weight change  HENT: Negative for congestion, mouth sores and sore throat  Respiratory: Negative for cough, chest tightness, shortness of breath and wheezing  Cardiovascular: Negative for chest pain and palpitations  Gastrointestinal: Negative for abdominal distention, abdominal pain, constipation, diarrhea, nausea and vomiting  Endocrine: Negative for cold intolerance and heat intolerance  Genitourinary: Positive for menstrual problem  Negative for dyspareunia, dysuria, genital sores, pelvic pain, vaginal bleeding, vaginal discharge and vaginal pain  Musculoskeletal: Negative for arthralgias  Skin: Negative for color change and rash  Neurological: Negative for dizziness, light-headedness and headaches  Hematological: Negative for adenopathy  Blood pressure 112/70, height 5' 9" (1 753 m), weight 93 9 kg (207 lb), last menstrual period 11/25/2022, currently breastfeeding  and Body mass index is 30 57 kg/m²  Physical Exam  Constitutional:       General: She is not in acute distress  Appearance: Normal appearance  She is well-developed and well-nourished  She is obese  She is not ill-appearing  HENT:      Head: Normocephalic and atraumatic  Eyes:      Extraocular Movements: Extraocular movements intact and EOM normal       Conjunctiva/sclera: Conjunctivae normal    Neck:      Thyroid: No thyromegaly  Trachea: No tracheal deviation  Cardiovascular:      Rate and Rhythm: Normal rate and regular rhythm  Heart sounds: Normal heart sounds  Pulmonary:      Effort: Pulmonary effort is normal  No respiratory distress  Breath sounds: Normal breath sounds  No stridor  No wheezing or rales  Abdominal:      General: Bowel sounds are normal  There is no distension  Palpations: Abdomen is soft  There is no mass  Tenderness: There is no abdominal tenderness  There is no guarding or rebound  Hernia: No hernia is present  Musculoskeletal:         General: No tenderness or edema  Normal range of motion        Cervical back: Normal range of motion and neck supple  Lymphadenopathy:      Cervical: No cervical adenopathy  Skin:     General: Skin is warm  Findings: No erythema or rash  Neurological:      Mental Status: She is alert and oriented to person, place, and time  Psychiatric:         Mood and Affect: Mood and affect and mood normal          Behavior: Behavior normal          Thought Content: Thought content normal          Judgment: Judgment normal           vulva: normal external genitalia for age and no lesions, masses, epithelial changes, or exudate  vagina: color pink and rugae  well formed rugae  cervix: parous and no lesions   uterus: NSSC, AF, NT, mobile and 10 wks  adnexa: no masses or tenderness      A/P:  Pt is a 33 y o   with      Janeth was seen today for menstrual problem  Diagnoses and all orders for this visit:    Irregular menses  -     Prolactin; Future  -     TSH, 3rd generation with Free T4 reflex; Future  -     17-Hydroxyprogesterone; Future  -     DHEA-sulfate; Future  -     Testosterone, free, total; Future  -     Insulin, fasting; Future  -     Progesterone; Future  -     Luteinizing hormone; Future  -     Estradiol; Future  -     Follicle stimulating hormone; Future  -     US pelvis complete w transvaginal; Future    Fertility testing  -     FL hysterosalpingogram; Future    Vaginal discharge  -     VAGINOSIS DNA PROBE (AFFIRM)    Vaginal odor  -     VAGINOSIS DNA PROBE (AFFIRM)      Rx for Semen Analysis recommended for

## 2022-12-06 ENCOUNTER — TELEPHONE (OUTPATIENT)
Dept: OBGYN CLINIC | Facility: CLINIC | Age: 32
End: 2022-12-06

## 2022-12-17 ENCOUNTER — APPOINTMENT (OUTPATIENT)
Dept: LAB | Facility: CLINIC | Age: 32
End: 2022-12-17

## 2022-12-17 DIAGNOSIS — N92.6 IRREGULAR MENSES: ICD-10-CM

## 2022-12-17 LAB
PROGEST SERPL-MCNC: 4.8 NG/ML
PROLACTIN SERPL-MCNC: 8.9 NG/ML
TSH SERPL DL<=0.05 MIU/L-ACNC: 4.38 UIU/ML (ref 0.45–4.5)

## 2022-12-18 LAB — INSULIN SERPL-ACNC: 11.8 MU/L (ref 3–25)

## 2022-12-19 LAB
DHEA-S SERPL-MCNC: 172 UG/DL (ref 84.8–378)
TESTOST FREE SERPL-MCNC: 1.6 PG/ML (ref 0–4.2)
TESTOST SERPL-MCNC: 15 NG/DL (ref 8–60)

## 2022-12-21 LAB — 17OHP SERPL-MCNC: 155 NG/DL

## 2022-12-27 ENCOUNTER — TELEPHONE (OUTPATIENT)
Dept: OBGYN CLINIC | Facility: CLINIC | Age: 32
End: 2022-12-27

## 2022-12-28 ENCOUNTER — TELEPHONE (OUTPATIENT)
Dept: OBGYN CLINIC | Facility: CLINIC | Age: 32
End: 2022-12-28

## 2022-12-28 NOTE — TELEPHONE ENCOUNTER
Spoke to pt's  and gave him the results of labs  He states his wife's LMP was 12/26   What day can I call him back with for the HSG?

## 2022-12-28 NOTE — TELEPHONE ENCOUNTER
Pt will wait until next LMP and prior auth to schedule the HSG  Appointment with radiology canceled

## 2022-12-28 NOTE — TELEPHONE ENCOUNTER
Pt will cancel HSG  Until next month when she gets her period again and at that time we will start the prior authorization  If denied by insurance pt's  is willing to pay out of pocket

## 2023-01-24 ENCOUNTER — TELEPHONE (OUTPATIENT)
Dept: OBGYN CLINIC | Facility: CLINIC | Age: 33
End: 2023-01-24

## 2023-01-24 NOTE — TELEPHONE ENCOUNTER
Pt's  called regarding 2 issues  Pt's  would like to discuss results from RMA which has been scanned into chart  PT also believes she will get her menstrual cycle by the end of this week and would like to start planning HSG  Especially because they ran into insurance problems last time  Please advise

## 2023-01-25 ENCOUNTER — TELEPHONE (OUTPATIENT)
Dept: OBGYN CLINIC | Facility: CLINIC | Age: 33
End: 2023-01-25

## 2023-01-25 NOTE — TELEPHONE ENCOUNTER
I called the patient's  back to review her semen analysis results  We reviewed that all of his parameters were normal except for the shape of his sperm which may affect his sperm's ability to penetrate the egg, but fertilization is also possible  He would like to repeat the test to make sure the results are correct  Repeat rx given and will mail to patient    He also reports she would like to schedule his wife's hSG  She will get her menses Friday  Will plan Friday 2/3  Office will call them confirmation of the appointment

## 2023-01-25 NOTE — TELEPHONE ENCOUNTER
Spoke to Alli from [facility] [place]  States she contacted Cristina Cisneros to start prior auth for HSG and followed up with patient's insurance today  They have not received office notes for auth to be completed  Faxed records to [phone] with pending auth # of [id number] per Jaqui

## 2023-01-31 ENCOUNTER — TELEPHONE (OUTPATIENT)
Dept: OBGYN CLINIC | Facility: CLINIC | Age: 33
End: 2023-01-31

## 2023-01-31 NOTE — TELEPHONE ENCOUNTER
Pt already has HSG scheduled this Friday at 2 pm    Rx was given to staff on 1/25 to be mailed  Please verify that it was sent

## 2023-01-31 NOTE — TELEPHONE ENCOUNTER
Pt  called to inform Provider per Dr Pat Martins request pt was to call when on set of her cycle  Pt is on her second day  Pt   Also, stated he has not received script to have semen analysis done

## 2023-02-03 ENCOUNTER — TELEPHONE (OUTPATIENT)
Dept: OBGYN CLINIC | Facility: CLINIC | Age: 33
End: 2023-02-03

## 2023-02-03 ENCOUNTER — HOSPITAL ENCOUNTER (OUTPATIENT)
Dept: RADIOLOGY | Facility: HOSPITAL | Age: 33
Discharge: HOME | End: 2023-02-03
Attending: OBSTETRICS & GYNECOLOGY

## 2023-02-03 ENCOUNTER — APPOINTMENT (OUTPATIENT)
Dept: LAB | Facility: HOSPITAL | Age: 33
End: 2023-02-03

## 2023-02-03 DIAGNOSIS — N92.6 IRREGULAR MENSES: ICD-10-CM

## 2023-02-03 DIAGNOSIS — Z31.41 FERTILITY TESTING: Primary | ICD-10-CM

## 2023-02-03 DIAGNOSIS — Z31.41 FERTILITY TESTING: ICD-10-CM

## 2023-02-03 LAB
ESTRADIOL SERPL-MCNC: 35 PG/ML
FSH SERPL-ACNC: 6.1 MIU/ML
LH SERPL-ACNC: 7.1 MIU/ML

## 2023-02-03 RX ADMIN — IOHEXOL 9 ML: 300 INJECTION, SOLUTION INTRAVENOUS at 14:11

## 2023-02-03 NOTE — PROCEDURES
The patient presents for hysterosalpingogram for fertility testing    Verbal consent for the procedure was obtained  All questions were answered  A time out was performed verifying the patient's name and date of birth  The patient was placed in dorsal lithotomy position  A sterile speculum was placed in the vagina  The cervix was cleansed with betadine x 3 swabs  The hysterosalpingogram catheter was placed through the cervical os and the balloon was dilated  The speculum was then removed  Under direct fluoroscopy, 9 ml of Omnipaque 240 was used to visualized the uterus and fallopian tubes  The uterus was noted to be retro flexed, had a normal shape and contour  Bilateral fallopian tubes were noted to be patent  The procedure was completed and all instruments were removed  The patient tolerated the procedure well  Impression: Normal hysterosalpingogram with bilateral fallopian tube spill

## 2023-02-03 NOTE — TELEPHONE ENCOUNTER
Pt seen for HSG today at the hospital  She reports that her ovulation detection kits have been pale since she started using  Review of records shows no ovulation on her previous progesterone level, but based on speaking with the patient, she reports her LMP was not 12/15 which is why we though there progesterone was done at the right time  She infact reports LMP 11/26 and then 12/26  LMP 1/29 for most recent cycle  Progesterone ordered for 2/18  Pt will have testing at that time    All questions answered

## 2023-02-13 ENCOUNTER — TELEPHONE (OUTPATIENT)
Dept: OBGYN CLINIC | Facility: CLINIC | Age: 33
End: 2023-02-13

## 2023-02-13 NOTE — TELEPHONE ENCOUNTER
Patients   called inquiring about test results  Informed Arnoldo Zavaleta we have not received semen analysis results yet  Contacted lab and spoke with [phone] who informed me they have faxed over the results 3 x   Office Faxed number confirmed, she will try re-faxing it again today

## 2023-02-18 ENCOUNTER — LAB (OUTPATIENT)
Dept: LAB | Facility: CLINIC | Age: 33
End: 2023-02-18

## 2023-02-18 DIAGNOSIS — Z31.41 FERTILITY TESTING: ICD-10-CM

## 2023-02-18 LAB — PROGEST SERPL-MCNC: 0.7 NG/ML

## 2023-02-28 ENCOUNTER — TELEPHONE (OUTPATIENT)
Dept: OBGYN CLINIC | Facility: CLINIC | Age: 33
End: 2023-02-28

## 2023-08-31 NOTE — PROGRESS NOTES
SV MCG=843 BPM  CRL=63 2mm=12w5d=Good growth prior study  BPD=17 8mm=12w5d  FL=9mm=12w5d    EDC=6/11/2020    UT=99 x 86 x 107 mm  RT OV=33 x 27 x 19 mm  LT OV=28 x 27 x 20 mm
No

## 2024-05-10 NOTE — TELEPHONE ENCOUNTER
I called the patient and her  answered  We reviewed that her urine culture was negative  Her TSH was normal as well  We also reviewed that her genetic screening was negative for B2 glycoprotein and prothrombin gene mutation, however she is a heterozygote for Factor V leiden  We reviewed that this means the Janeth should never take medications containing estrogen as she is at high  Risks for clots and that she start Lovenox in each pregnancy in the first trimester  He verifies understanding  We also reviewed that her family members should be offered testing for the same  All questions answered  ----- Message from Bisi Juarez sent at 1/21/2020  6:31 PM EST -----  Dr Rosario Overton,    Please call patient with results as I wouldn't be able to really explain well in Lao  Thanks     ----- Message -----  From: Hernan Lorenzo MA  Sent: 1/20/2020  11:08 AM EST  To: Bisi Juarez    Attempted to reach patient's   Please help explain the message from Dr Rosario Overton as he may have trouble with the language barrier  ----- Message -----  From: Jayashree Juarez MD  Sent: 1/19/2020  12:00 PM EST  To: Hernan Lorenzo MA    Please call the patient and advise her she is a carrier of the factor V leiden gene (this increases the risk of blood clots) and therefore she should never use estrogen containing contraception and she should also use anticoagulants in each pregnancy to prevent clotting  10-May-2024 20:10